# Patient Record
Sex: FEMALE | Race: WHITE | Employment: UNEMPLOYED | URBAN - METROPOLITAN AREA
[De-identification: names, ages, dates, MRNs, and addresses within clinical notes are randomized per-mention and may not be internally consistent; named-entity substitution may affect disease eponyms.]

---

## 2016-09-12 LAB — GRBS, EXTERNAL: POSITIVE

## 2017-01-30 PROBLEM — D50.9 IRON DEFICIENCY ANEMIA: Status: ACTIVE | Noted: 2017-01-30

## 2017-02-09 ENCOUNTER — HOSPITAL ENCOUNTER (EMERGENCY)
Age: 22
Discharge: HOME | End: 2017-02-09
Attending: OBSTETRICS & GYNECOLOGY | Admitting: OBSTETRICS & GYNECOLOGY
Payer: COMMERCIAL

## 2017-02-09 VITALS — DIASTOLIC BLOOD PRESSURE: 74 MMHG | TEMPERATURE: 98.2 F | SYSTOLIC BLOOD PRESSURE: 140 MMHG | HEART RATE: 100 BPM

## 2017-02-09 LAB
ERYTHROCYTE [DISTWIDTH] IN BLOOD BY AUTOMATED COUNT: 13.4 % (ref 11.9–14.6)
HCT VFR BLD AUTO: 31 % (ref 35.8–46.3)
HGB BLD-MCNC: 10.5 G/DL (ref 11.7–15.4)
MCH RBC QN AUTO: 31.1 PG (ref 26.1–32.9)
MCHC RBC AUTO-ENTMCNC: 33.9 G/DL (ref 31.4–35)
MCV RBC AUTO: 91.7 FL (ref 79.6–97.8)
PLATELET # BLD AUTO: 247 K/UL (ref 150–450)
PMV BLD AUTO: 10.4 FL (ref 10.8–14.1)
RBC # BLD AUTO: 3.38 M/UL (ref 4.05–5.25)
WBC # BLD AUTO: 11.9 K/UL (ref 4.3–11.1)

## 2017-02-09 PROCEDURE — 85027 COMPLETE CBC AUTOMATED: CPT | Performed by: OBSTETRICS & GYNECOLOGY

## 2017-02-09 PROCEDURE — 59025 FETAL NON-STRESS TEST: CPT

## 2017-02-09 PROCEDURE — 99283 EMERGENCY DEPT VISIT LOW MDM: CPT

## 2017-02-09 PROCEDURE — 36415 COLL VENOUS BLD VENIPUNCTURE: CPT | Performed by: OBSTETRICS & GYNECOLOGY

## 2017-02-09 NOTE — IP AVS SNAPSHOT
Summary of Care Report The Summary of Care report has been created to help improve care coordination. Users with access to [facility] or [address] (Web-based application) may access additional patient information including the Discharge Summary. If you are not currently a [address] user and need more information, please call the number listed below in the [place] [address] section and ask to be connected with Medical Records. Facility Information Name Address Phone [address] [place] [address] [address] [place] [id number] [phone] Patient Information Patient Name Sex LATISHA Kee ([id number]) Female 1995 Discharge Information Admitting Provider Service Area Unit Emeka Paulino MD / [address] [facility] 4 Antepartum / [phone] Discharge Provider Discharge Date/Time Discharge Disposition Destination (none) (none) (none) (none) Patient Language Language ENGLISH [13] Problem List as of 2017  Date Reviewed: 2017 Codes Priority Class Noted - Resolved Former smoker ICD-10-CM: Z45.985 ICD-9-CM: V15.82   2016 - Present Overview Signed 2016 11:16 PM by Chandni Elizabeth MD  
  Quit w/ +hcg Supervision of low-risk pregnancy ICD-10-CM: Z34.90 ICD-9-CM: V22.1   2016 - Present Overview Signed 2016  9:55 AM by Alvarez Barron RN  
  · Start Vitamin D 2000IU daily. · Start Calcium 1000mg daily. Group B streptococcal bacteriuria ICD-10-CM: R82.71 ICD-9-CM: 599.0, 041.02   2016 - Present Overview Addendum 2017  8:29 AM by Alvarez Barron RN Asymptomatic GBS bacteriuria w/ concomitant Citrobacter on intake--sensitive to Amoxicillin. Treated w/ Augmentin (Rx ) to adequately cover both No need to repeat GBS at 36wks--will need ppx in labor. RADHIKA neg Will need suppression if has another UTI this pregnancy Scoliosis ICD-10-CM: M41.9 ICD-9-CM: 737.30   10/3/2016 - Present Overview Signed 10/3/2016  3:06 PM by Bridgette Pederson MD  
  Pt reports dx 3-4yrs ago but no FU; no surgeries/rods Family history of congenital heart defect ICD-10-CM: Z82.79 ICD-9-CM: V19.5   11/15/2016 - Present Overview Addendum 2/1/2017  9:47 AM by Yury Rankin MD  
  11/23 [facility]- Normal fetal echo. 2/1/2017 [facility]- normal follow up fetal echo Fetal hydronephrosis in pregnancy, antepartum condition ICD-10-CM: O35. 8XX0 ICD-9-CM: 655.83   11/23/2016 - Present Overview Addendum 2/1/2017  9:41 AM by Inge Penn RN  
  11/23/2016 at [facility]:  Left 3.5mm (nl), Right 5.0 mm, mild hydro. Explained finding to patient and  , reassured. 2/1/2017 at [facility]:  Fetal hydro resolved. · No further follow up needed at [facility]. · Follow up fetal growth in OB office in 4 weeks. Near syncope ICD-10-CM: R55 
ICD-9-CM: 780.2   12/22/2016 - Present Overview Signed 12/22/2016 12:14 PM by Yun Bowman MD  
  12/22/16 - seen in ED; will refer to cardiology Iron deficiency anemia ICD-10-CM: D50.9 ICD-9-CM: 280.9   1/30/2017 - Present You are allergic to the following No active allergies Current Discharge Medication List  
  
ASK your doctor about these medications Dose & Instructions Dispensing Information Comments  
 calcium carbonate 200 mg calcium (500 mg) Chew Commonly known as:  TUMS Dose:  1 Tab Take 1 Tab by mouth daily. Indications: as needed Refills:  0  
   
 PNV no.24-iron-folic acid-dha -333 mg-mcg-mg Cmpk Commonly known as:  PRENATAL DHA+COMPLETE PRENATAL Dose:  1 Tab Take 1 Tab by mouth daily. OTC Quantity:  30 Tab Refills:  11 SLOW  mg (45 mg iron) ER tablet Generic drug:  ferrous sulfate Take  by mouth Daily (before breakfast). Refills:  0 Follow-up Information None Discharge Instructions DISCHARGE SUMMARY from Nurse The following personal items are in your possession at time of discharge: 
 
  
  
  
  
  
  
  
  
 
 
 
 
PATIENT INSTRUCTIONS: 
 
After general anesthesia or intravenous sedation, for 24 hours or while taking prescription Narcotics: · Limit your activities · Do not drive and operate hazardous machinery · Do not make important personal or business decisions · Do  not drink alcoholic beverages · If you have not urinated within 8 hours after discharge, please contact your surgeon on call. Report the following to your surgeon: 
· Excessive pain, swelling, redness or odor of or around the surgical area · Temperature over 100.5 · Nausea and vomiting lasting longer than 4 hours or if unable to take medications · Any signs of decreased circulation or nerve impairment to extremity: change in color, persistent  numbness, tingling, coldness or increase pain · Any questions What to do at Home: 
Recommended activity: Activity as tolerated. Increase iron supplement to twice a day. Call [facility] in the morning for a follow-up appointment. If you experience any of the following symptoms your water breaks, bright red vaginal bleeding, more than 6 contractions in an hour please follow up with your physician or [facility]. *  Please give a list of your current medications to your Primary Care Provider. *  Please update this list whenever your medications are discontinued, doses are 
    changed, or new medications (including over-the-counter products) are added. *  Please carry medication information at all times in case of emergency situations. These are general instructions for a healthy lifestyle: No smoking/ No tobacco products/ Avoid exposure to second hand smoke Surgeon General's Warning:  Quitting smoking now greatly reduces serious risk to your health. Obesity, smoking, and sedentary lifestyle greatly increases your risk for illness A healthy diet, regular physical exercise & weight monitoring are important for maintaining a healthy lifestyle You may be retaining fluid if you have a history of heart failure or if you experience any of the following symptoms:  Weight gain of 3 pounds or more overnight or 5 pounds in a week, increased swelling in our hands or feet or shortness of breath while lying flat in bed. Please call your doctor as soon as you notice any of these symptoms; do not wait until your next office visit. Recognize signs and symptoms of STROKE: 
 
F-face looks uneven A-arms unable to move or move unevenly S-speech slurred or non-existent T-time-call 911 as soon as signs and symptoms begin-DO NOT go Back to bed or wait to see if you get better-TIME IS BRAIN. Warning Signs of HEART ATTACK Call 911 if you have these symptoms: 
? Chest discomfort. Most heart attacks involve discomfort in the center of the chest that lasts more than a few minutes, or that goes away and comes back. It can feel like uncomfortable pressure, squeezing, fullness, or pain. ? Discomfort in other areas of the upper body. Symptoms can include pain or discomfort in one or both arms, the back, neck, jaw, or stomach. ? Shortness of breath with or without chest discomfort. ? Other signs may include breaking out in a cold sweat, nausea, or lightheadedness. Don't wait more than five minutes to call [address]! Fast action can save your life. Calling 911 is almost always the fastest way to get lifesaving treatment. Emergency Medical Services staff can begin treatment when they arrive  up to an hour sooner than if someone gets to the hospital by car. The discharge information has been reviewed with the patient. The patient verbalized understanding. Discharge medications reviewed with the patient and appropriate educational materials and side effects teaching were provided. Pregnancy Precautions: Care Instructions Your Care Instructions There is no sure way to prevent labor before your due date ( labor) or to prevent most other pregnancy problems. But there are things you can do to increase your chances of a healthy pregnancy. Go to your appointments, follow your doctor's advice, and take good care of yourself. Eat well, and exercise (if your doctor agrees). And make sure to drink plenty of water. Follow-up care is a key part of your treatment and safety. Be sure to make and go to all appointments, and call your doctor if you are having problems. It's also a good idea to know your test results and keep a list of the medicines you take. How can you care for yourself at home? · Make sure you go to your prenatal appointments. At each visit, your doctor will check your blood pressure. Your doctor will also check to see if you have protein in your urine. High blood pressure and protein in urine are signs of preeclampsia. This condition can be dangerous for you and your baby. · Drink plenty of fluids, enough so that your urine is light yellow or clear like water. Dehydration can cause contractions. If you have kidney, heart, or liver disease and have to limit fluids, talk with your doctor before you increase the amount of fluids you drink. · Tell your doctor right away if you notice any symptoms of an infection, such as: ¨ Burning when you urinate. ¨ A foul-smelling discharge from your vagina. ¨ Vaginal itching. ¨ Unexplained fever. ¨ Unusual pain or soreness in your uterus or lower belly. · Eat a balanced diet. Include plenty of foods that are high in calcium and iron. ¨ Foods high in calcium include milk, cheese, yogurt, almonds, and broccoli.  
¨ Foods high in iron include red meat, shellfish, poultry, eggs, beans, raisins, whole-grain bread, and leafy green vegetables. · Do not smoke. If you need help quitting, talk to your doctor about stop-smoking programs and medicines. These can increase your chances of quitting for good. · Do not drink alcohol or use illegal drugs. · Follow your doctor's directions about activity. Your doctor will let you know how much, if any, exercise you can do. · Ask your doctor if you can have sex. If you are at risk for early labor, your doctor may ask you to not have sex. · Take care to prevent falls. During pregnancy, your joints are loose, and your balance is off. Sports such as bicycling, skiing, or in-line skating can increase your risk of falling. And don't ride horses or motorcycles, dive, water ski, scuba dive, or parachute jump while you are pregnant. · Avoid getting very hot. Do not use saunas or hot tubs. Avoid staying out in the sun in hot weather for long periods. Take acetaminophen (Tylenol) to lower a high fever. · Do not take any over-the-counter or herbal medicines or supplements without talking to your doctor or pharmacist first. 
When should you call for help? Call 911 anytime you think you may need emergency care. For example, call if: 
· You passed out (lost consciousness). · You have severe vaginal bleeding. · You have severe pain in your belly or pelvis. · You have had fluid gushing or leaking from your vagina and you know or think the umbilical cord is bulging into your vagina. If this happens, immediately get down on your knees so your rear end (buttocks) is higher than your head. This will decrease the pressure on the cord until help arrives. Call your doctor now or seek immediate medical care if: 
· You have signs of preeclampsia, such as: 
¨ Sudden swelling of your face, hands, or feet. ¨ New vision problems (such as dimness or blurring). ¨ A severe headache. · You have any vaginal bleeding. · You have belly pain or cramping. · You have a fever. · You have had regular contractions (with or without pain) for an hour. This means that you have 8 or more within 1 hour or 4 or more in 20 minutes after you change your position and drink fluids. · You have a sudden release of fluid from your vagina. · You have low back pain or pelvic pressure that does not go away. · You notice that your baby has stopped moving or is moving much less than normal. 
Watch closely for changes in your health, and be sure to contact your doctor if you have any problems. Where can you learn more? Go to http://rebekah-nadja.info/. Enter [id number] in the search box to learn more about \"Pregnancy Precautions: Care Instructions. \" Current as of: May 30, 2016 Content Version: 11.1 © 2398-2887 [employer]. Care instructions adapted under license by [employer] (which disclaims liability or warranty for this information). If you have questions about a medical condition or this instruction, always ask your healthcare professional. [place] [address] any warranty or liability for your use of this information. Chart Review Routing History No Routing History on File

## 2017-02-09 NOTE — IP AVS SNAPSHOT
Current Discharge Medication List  
  
ASK your doctor about these medications Dose & Instructions Dispensing Information Comments Morning Noon Evening Bedtime  
 calcium carbonate 200 mg calcium (500 mg) Chew Commonly known as:  TUMS Your next dose is: Today, Tomorrow Other:  ____________ Dose:  1 Tab Take 1 Tab by mouth daily. Indications: as needed Refills:  0  
     
   
   
   
  
 PNV no.24-iron-folic acid-dha -556 mg-mcg-mg Cmpk Commonly known as:  PRENATAL DHA+COMPLETE PRENATAL Your next dose is: Today, Tomorrow Other:  ____________ Dose:  1 Tab Take 1 Tab by mouth daily. OTC Quantity:  30 Tab Refills:  11 SLOW  mg (45 mg iron) ER tablet Generic drug:  ferrous sulfate Your next dose is: Today, Tomorrow Other:  ____________ Take  by mouth Daily (before breakfast). Refills:  0

## 2017-02-09 NOTE — IP AVS SNAPSHOT
Reena Andersen 
 
 
 [address] 
[phone] Patient: Tamera Baird MRN: [id number] :[id number] You are allergic to the following No active allergies Recent Documentation OB Status Smoking Status Pregnant Former Smoker Emergency Contacts Name Discharge Info Relation Home Work Mobile Francesca Marie  Mother [14] [phone] Nathanael Newell  Boyfriend [17] [phone] About your hospitalization You were admitted on:  N/A You last received care in the:  E 4 [facility] You were discharged on:  2017 Unit phone number:  [phone] Why you were hospitalized Your primary diagnosis was:  Not on File Providers Seen During Your Hospitalizations Provider Role Specialty Primary office phone Jessica Yadav MD Attending Provider Obstetrics & Gynecology [phone] Your Primary Care Physician (PCP) Primary Care Physician Office Phone Office Fax UNKNOWN, PROVIDER ** None ** ** None ** Follow-up Information None Your Appointments 2017  1:30 PM EST Return OB with Raman Herrera MD  
[facility]) [address] [id number] [phone] 2017  8:45 AM EST Return OB with Raman Herrera MD  
[facility]) [address] [place] [id number]  
[phone] 2017  2:00 PM EST Return OB with Raman Herrera MD  
[facility]) [address] [address]  
[phone] 2017  2:00 PM EDT Return OB with Raman Herrera MD  
[facility]) [address] [place] [address] 187 [facility] [phone] [phone] Current Discharge Medication List  
  
ASK your doctor about these medications Dose & Instructions Dispensing Information Comments Morning Noon Evening Bedtime  
 calcium carbonate 200 mg calcium (500 mg) Chew Commonly known as:  TUMS Your next dose is: Today, Tomorrow Other:  _________ Dose:  1 Tab Take 1 Tab by mouth daily. Indications: as needed Refills:  0  
     
   
   
   
  
 PNV no.24-iron-folic acid-dha -978 mg-mcg-mg Cmpk Commonly known as:  PRENATAL DHA+COMPLETE PRENATAL Your next dose is: Today, Tomorrow Other:  _________ Dose:  1 Tab Take 1 Tab by mouth daily. OTC Quantity:  30 Tab Refills:  11 SLOW  mg (45 mg iron) ER tablet Generic drug:  ferrous sulfate Your next dose is: Today, Tomorrow Other:  _________ Take  by mouth Daily (before breakfast). Refills:  0 Discharge Instructions DISCHARGE SUMMARY from Nurse The following personal items are in your possession at time of discharge: 
 
  
  
  
  
  
  
  
  
 
 
 
 
PATIENT INSTRUCTIONS: 
 
After general anesthesia or intravenous sedation, for 24 hours or while taking prescription Narcotics: · Limit your activities · Do not drive and operate hazardous machinery · Do not make important personal or business decisions · Do  not drink alcoholic beverages · If you have not urinated within 8 hours after discharge, please contact your surgeon on call. Report the following to your surgeon: 
· Excessive pain, swelling, redness or odor of or around the surgical area · Temperature over 100.5 · Nausea and vomiting lasting longer than 4 hours or if unable to take medications · Any signs of decreased circulation or nerve impairment to extremity: change in color, persistent  numbness, tingling, coldness or increase pain · Any questions What to do at Home: 
Recommended activity: Activity as tolerated. Increase iron supplement to twice a day. Call [facility] in the morning for a follow-up appointment. If you experience any of the following symptoms your water breaks, bright red vaginal bleeding, more than 6 contractions in an hour please follow up with your physician or [address]. *  Please give a list of your current medications to your Primary Care Provider. *  Please update this list whenever your medications are discontinued, doses are 
    changed, or new medications (including over-the-counter products) are added. *  Please carry medication information at all times in case of emergency situations. These are general instructions for a healthy lifestyle: No smoking/ No tobacco products/ Avoid exposure to second hand smoke Surgeon General's Warning:  Quitting smoking now greatly reduces serious risk to your health. Obesity, smoking, and sedentary lifestyle greatly increases your risk for illness A healthy diet, regular physical exercise & weight monitoring are important for maintaining a healthy lifestyle You may be retaining fluid if you have a history of heart failure or if you experience any of the following symptoms:  Weight gain of 3 pounds or more overnight or 5 pounds in a week, increased swelling in our hands or feet or shortness of breath while lying flat in bed. Please call your doctor as soon as you notice any of these symptoms; do not wait until your next office visit. Recognize signs and symptoms of STROKE: 
 
F-face looks uneven A-arms unable to move or move unevenly S-speech slurred or non-existent T-time-call 911 as soon as signs and symptoms begin-DO NOT go Back to bed or wait to see if you get better-TIME IS BRAIN.  
 
Warning Signs of HEART ATTACK  
 
 Call 911 if you have these symptoms: 
? Chest discomfort. Most heart attacks involve discomfort in the center of the chest that lasts more than a few minutes, or that goes away and comes back. It can feel like uncomfortable pressure, squeezing, fullness, or pain. ? Discomfort in other areas of the upper body. Symptoms can include pain or discomfort in one or both arms, the back, neck, jaw, or stomach. ? Shortness of breath with or without chest discomfort. ? Other signs may include breaking out in a cold sweat, nausea, or lightheadedness. Don't wait more than five minutes to call [address]! Fast action can save your life. Calling 911 is almost always the fastest way to get lifesaving treatment. Emergency Medical Services staff can begin treatment when they arrive  up to an hour sooner than if someone gets to the hospital by car. The discharge information has been reviewed with the patient. The patient verbalized understanding. Discharge medications reviewed with the patient and appropriate educational materials and side effects teaching were provided. Pregnancy Precautions: Care Instructions Your Care Instructions There is no sure way to prevent labor before your due date ( labor) or to prevent most other pregnancy problems. But there are things you can do to increase your chances of a healthy pregnancy. Go to your appointments, follow your doctor's advice, and take good care of yourself. Eat well, and exercise (if your doctor agrees). And make sure to drink plenty of water. Follow-up care is a key part of your treatment and safety. Be sure to make and go to all appointments, and call your doctor if you are having problems. It's also a good idea to know your test results and keep a list of the medicines you take. How can you care for yourself at home? · Make sure you go to your prenatal appointments.  At each visit, your doctor will check your blood pressure. Your doctor will also check to see if you have protein in your urine. High blood pressure and protein in urine are signs of preeclampsia. This condition can be dangerous for you and your baby. · Drink plenty of fluids, enough so that your urine is light yellow or clear like water. Dehydration can cause contractions. If you have kidney, heart, or liver disease and have to limit fluids, talk with your doctor before you increase the amount of fluids you drink. · Tell your doctor right away if you notice any symptoms of an infection, such as: ¨ Burning when you urinate. ¨ A foul-smelling discharge from your vagina. ¨ Vaginal itching. ¨ Unexplained fever. ¨ Unusual pain or soreness in your uterus or lower belly. · Eat a balanced diet. Include plenty of foods that are high in calcium and iron. ¨ Foods high in calcium include milk, cheese, yogurt, almonds, and broccoli. ¨ Foods high in iron include red meat, shellfish, poultry, eggs, beans, raisins, whole-grain bread, and leafy green vegetables. · Do not smoke. If you need help quitting, talk to your doctor about stop-smoking programs and medicines. These can increase your chances of quitting for good. · Do not drink alcohol or use illegal drugs. · Follow your doctor's directions about activity. Your doctor will let you know how much, if any, exercise you can do. · Ask your doctor if you can have sex. If you are at risk for early labor, your doctor may ask you to not have sex. · Take care to prevent falls. During pregnancy, your joints are loose, and your balance is off. Sports such as bicycling, skiing, or in-line skating can increase your risk of falling. And don't ride horses or motorcycles, dive, water ski, scuba dive, or parachute jump while you are pregnant. · Avoid getting very hot. Do not use saunas or hot tubs. Avoid staying out in the sun in hot weather for long periods.  Take acetaminophen (Tylenol) to lower a high fever. · Do not take any over-the-counter or herbal medicines or supplements without talking to your doctor or pharmacist first. 
When should you call for help? Call 911 anytime you think you may need emergency care. For example, call if: 
· You passed out (lost consciousness). · You have severe vaginal bleeding. · You have severe pain in your belly or pelvis. · You have had fluid gushing or leaking from your vagina and you know or think the umbilical cord is bulging into your vagina. If this happens, immediately get down on your knees so your rear end (buttocks) is higher than your head. This will decrease the pressure on the cord until help arrives. Call your doctor now or seek immediate medical care if: 
· You have signs of preeclampsia, such as: 
¨ Sudden swelling of your face, hands, or feet. ¨ New vision problems (such as dimness or blurring). ¨ A severe headache. · You have any vaginal bleeding. · You have belly pain or cramping. · You have a fever. · You have had regular contractions (with or without pain) for an hour. This means that you have 8 or more within 1 hour or 4 or more in 20 minutes after you change your position and drink fluids. · You have a sudden release of fluid from your vagina. · You have low back pain or pelvic pressure that does not go away. · You notice that your baby has stopped moving or is moving much less than normal. 
Watch closely for changes in your health, and be sure to contact your doctor if you have any problems. Where can you learn more? Go to http://rebekah-nadja.info/. Enter [id number] in the search box to learn more about \"Pregnancy Precautions: Care Instructions. \" Current as of: May 30, 2016 Content Version: 11.1 © 4179-5791 [employer].  Care instructions adapted under license by [employer] (which disclaims liability or warranty for this information). If you have questions about a medical condition or this instruction, always ask your healthcare professional. Dayna Melgoza any warranty or liability for your use of this information. Discharge Orders None [facility]! Dear Bonilla [address]: Thank you for requesting a Med[employer] account. Our records indicate that you already have an active Med[employer] DX account. You can access your account anytime at [email or web address] Did you know that you can access your hospital and ER discharge instructions at any time in Med[employer]? You can also review all of your test results from your hospital stay or ER visit. Additional Information If you have questions, please visit the Frequently Asked Questions section of the MedTest DX website at [email or web address]/. Remember, MedTest DX is NOT to be used for urgent needs. For medical emergencies, dial 911. Now available from your iPhone and Android! General Information Please provide this summary of care documentation to your next provider. Patient Signature:  ____________________________________________________________ Date:  ____________________________________________________________  
  
Joycelyn Asencio Provider Signature:  ____________________________________________________________ Date:  ____________________________________________________________

## 2017-02-10 NOTE — PROGRESS NOTES
Discharge instructions reviewed with patient; pt verbalizes understanding. Pt discharged to home accompanied by  Significant other.

## 2017-02-10 NOTE — DISCHARGE INSTRUCTIONS
DISCHARGE SUMMARY from Nurse    The following personal items are in your possession at time of discharge:                                    PATIENT INSTRUCTIONS:    After general anesthesia or intravenous sedation, for 24 hours or while taking prescription Narcotics:  · Limit your activities  · Do not drive and operate hazardous machinery  · Do not make important personal or business decisions  · Do  not drink alcoholic beverages  · If you have not urinated within 8 hours after discharge, please contact your surgeon on call. Report the following to your surgeon:  · Excessive pain, swelling, redness or odor of or around the surgical area  · Temperature over 100.5  · Nausea and vomiting lasting longer than 4 hours or if unable to take medications  · Any signs of decreased circulation or nerve impairment to extremity: change in color, persistent  numbness, tingling, coldness or increase pain  · Any questions        What to do at Home:  Recommended activity: Activity as tolerated. Increase iron supplement to twice a day. Call [facility] in the morning for a follow-up appointment. If you experience any of the following symptoms your water breaks, bright red vaginal bleeding, more than 6 contractions in an hour please follow up with your physician or [facility]. *  Please give a list of your current medications to your Primary Care Provider. *  Please update this list whenever your medications are discontinued, doses are      changed, or new medications (including over-the-counter products) are added. *  Please carry medication information at all times in case of emergency situations. These are general instructions for a healthy lifestyle:    No smoking/ No tobacco products/ Avoid exposure to second hand smoke    Surgeon General's Warning:  Quitting smoking now greatly reduces serious risk to your health.     Obesity, smoking, and sedentary lifestyle greatly increases your risk for illness    A healthy diet, regular physical exercise & weight monitoring are important for maintaining a healthy lifestyle    You may be retaining fluid if you have a history of heart failure or if you experience any of the following symptoms:  Weight gain of 3 pounds or more overnight or 5 pounds in a week, increased swelling in our hands or feet or shortness of breath while lying flat in bed. Please call your doctor as soon as you notice any of these symptoms; do not wait until your next office visit. Recognize signs and symptoms of STROKE:    F-face looks uneven    A-arms unable to move or move unevenly    S-speech slurred or non-existent    T-time-call 911 as soon as signs and symptoms begin-DO NOT go       Back to bed or wait to see if you get better-TIME IS BRAIN. Warning Signs of HEART ATTACK     Call 911 if you have these symptoms:   Chest discomfort. Most heart attacks involve discomfort in the center of the chest that lasts more than a few minutes, or that goes away and comes back. It can feel like uncomfortable pressure, squeezing, fullness, or pain.  Discomfort in other areas of the upper body. Symptoms can include pain or discomfort in one or both arms, the back, neck, jaw, or stomach.  Shortness of breath with or without chest discomfort.  Other signs may include breaking out in a cold sweat, nausea, or lightheadedness. Don't wait more than five minutes to call 911 - MINUTES MATTER! Fast action can save your life. Calling 911 is almost always the fastest way to get lifesaving treatment. Emergency Medical Services staff can begin treatment when they arrive -- up to an hour sooner than if someone gets to the hospital by car. The discharge information has been reviewed with the patient. The patient verbalized understanding. Discharge medications reviewed with the patient and appropriate educational materials and side effects teaching were provided.          Pregnancy Precautions: Care Instructions  Your Care Instructions  There is no sure way to prevent labor before your due date ( labor) or to prevent most other pregnancy problems. But there are things you can do to increase your chances of a healthy pregnancy. Go to your appointments, follow your doctor's advice, and take good care of yourself. Eat well, and exercise (if your doctor agrees). And make sure to drink plenty of water. Follow-up care is a key part of your treatment and safety. Be sure to make and go to all appointments, and call your doctor if you are having problems. It's also a good idea to know your test results and keep a list of the medicines you take. How can you care for yourself at home? · Make sure you go to your prenatal appointments. At each visit, your doctor will check your blood pressure. Your doctor will also check to see if you have protein in your urine. High blood pressure and protein in urine are signs of preeclampsia. This condition can be dangerous for you and your baby. · Drink plenty of fluids, enough so that your urine is light yellow or clear like water. Dehydration can cause contractions. If you have kidney, heart, or liver disease and have to limit fluids, talk with your doctor before you increase the amount of fluids you drink. · Tell your doctor right away if you notice any symptoms of an infection, such as:  ¨ Burning when you urinate. ¨ A foul-smelling discharge from your vagina. ¨ Vaginal itching. ¨ Unexplained fever. ¨ Unusual pain or soreness in your uterus or lower belly. · Eat a balanced diet. Include plenty of foods that are high in calcium and iron. ¨ Foods high in calcium include milk, cheese, yogurt, almonds, and broccoli. ¨ Foods high in iron include red meat, shellfish, poultry, eggs, beans, raisins, whole-grain bread, and leafy green vegetables. · Do not smoke. If you need help quitting, talk to your doctor about stop-smoking programs and medicines. These can increase your chances of quitting for good. · Do not drink alcohol or use illegal drugs. · Follow your doctor's directions about activity. Your doctor will let you know how much, if any, exercise you can do. · Ask your doctor if you can have sex. If you are at risk for early labor, your doctor may ask you to not have sex. · Take care to prevent falls. During pregnancy, your joints are loose, and your balance is off. Sports such as bicycling, skiing, or in-line skating can increase your risk of falling. And don't ride horses or motorcycles, dive, water ski, scuba dive, or parachute jump while you are pregnant. · Avoid getting very hot. Do not use saunas or hot tubs. Avoid staying out in the sun in hot weather for long periods. Take acetaminophen (Tylenol) to lower a high fever. · Do not take any over-the-counter or herbal medicines or supplements without talking to your doctor or pharmacist first.  When should you call for help? Call 911 anytime you think you may need emergency care. For example, call if:  · You passed out (lost consciousness). · You have severe vaginal bleeding. · You have severe pain in your belly or pelvis. · You have had fluid gushing or leaking from your vagina and you know or think the umbilical cord is bulging into your vagina. If this happens, immediately get down on your knees so your rear end (buttocks) is higher than your head. This will decrease the pressure on the cord until help arrives. Call your doctor now or seek immediate medical care if:  · You have signs of preeclampsia, such as:  ¨ Sudden swelling of your face, hands, or feet. ¨ New vision problems (such as dimness or blurring). ¨ A severe headache. · You have any vaginal bleeding. · You have belly pain or cramping. · You have a fever. · You have had regular contractions (with or without pain) for an hour.  This means that you have 8 or more within 1 hour or 4 or more in 20 minutes after you change your position and drink fluids. · You have a sudden release of fluid from your vagina. · You have low back pain or pelvic pressure that does not go away. · You notice that your baby has stopped moving or is moving much less than normal.  Watch closely for changes in your health, and be sure to contact your doctor if you have any problems. Where can you learn more? Go to http://rebekah-nadja.info/. Enter [id number] in the search box to learn more about \"Pregnancy Precautions: Care Instructions. \"  Current as of: May 30, 2016  Content Version: 11.1  © 0646-8160 [employer]. Care instructions adapted under license by [employer] (which disclaims liability or warranty for this information). If you have questions about a medical condition or this instruction, always ask your healthcare professional. [address] any warranty or liability for your use of this information.

## 2017-02-10 NOTE — PROGRESS NOTES
Pt presents from home with c/o \"blacking out a couple of times yesterday at work \" , and of having pain in upper abd. EFM applied after explanation and verbal consent obtained.

## 2017-02-10 NOTE — PROGRESS NOTES
Lab results called to Dr. Alex Akhtar. Orders received to discharge patient with instructions to increase Iron supplement to twice a day and call ob/gyn office in the morning for a follow-up appointment.

## 2017-03-07 PROBLEM — O40.9XX0 POLYHYDRAMNIOS: Status: ACTIVE | Noted: 2017-03-07

## 2017-03-24 ENCOUNTER — HOSPITAL ENCOUNTER (EMERGENCY)
Age: 22
Discharge: HOME | End: 2017-03-24
Attending: OBSTETRICS & GYNECOLOGY | Admitting: OBSTETRICS & GYNECOLOGY
Payer: COMMERCIAL

## 2017-03-24 VITALS
BODY MASS INDEX: 30.22 KG/M2 | SYSTOLIC BLOOD PRESSURE: 128 MMHG | DIASTOLIC BLOOD PRESSURE: 72 MMHG | WEIGHT: 177 LBS | HEART RATE: 114 BPM | RESPIRATION RATE: 18 BRPM | HEIGHT: 64 IN

## 2017-03-24 PROCEDURE — 59025 FETAL NON-STRESS TEST: CPT

## 2017-03-24 PROCEDURE — 99282 EMERGENCY DEPT VISIT SF MDM: CPT

## 2017-03-24 NOTE — DISCHARGE INSTRUCTIONS
Learning About When to Call Your Doctor During Pregnancy (After 20 Weeks)  Your Care Instructions  It's common to have concerns about what might be a problem during pregnancy. Although most pregnant women don't have any serious problems, it's important to know when to call your doctor if you have certain symptoms or signs of labor. These are general suggestions. Your doctor may give you some more information about when to call. When to call your doctor (after 20 weeks)  Call 911 anytime you think you may need emergency care. For example, call if:  · You have severe vaginal bleeding. · You have sudden, severe pain in your belly. · You passed out (lost consciousness). · You have a seizure. · You see or feel the umbilical cord. · You think you are about to deliver your baby and can't make it safely to the hospital.  Call your doctor now or seek immediate medical care if:  · You have vaginal bleeding. · You have belly pain. · You have a fever. · You have symptoms of preeclampsia, such as:  ¨ Sudden swelling of your face, hands, or feet. ¨ New vision problems (such as dimness or blurring). ¨ A severe headache. · You have a sudden release of fluid from your vagina. (You think your water broke.)  · You think that you may be in labor. This means that you've had at least 4 contractions within 20 minutes or at least 8 contractions in an hour. · You notice that your baby has stopped moving or is moving much less than normal.  · You have symptoms of a urinary tract infection. These may include:  ¨ Pain or burning when you urinate. ¨ A frequent need to urinate without being able to pass much urine. ¨ Pain in the flank, which is just below the rib cage and above the waist on either side of the back. ¨ Blood in your urine. Watch closely for changes in your health, and be sure to contact your doctor if:  · You have vaginal discharge that smells bad.   · You have skin changes, such as:  ¨ A rash.  ¨ Itching. ¨ Yellow color to your skin. · You have other concerns about your pregnancy. If you have labor signs at 37 weeks or more  If you have signs of labor at 37 weeks or more, your doctor may tell you to call when your labor becomes more active. Symptoms of active labor include:  · Contractions that are regular. · Contractions that are less than 5 minutes apart. · Contractions that are hard to talk through. Follow-up care is a key part of your treatment and safety. Be sure to make and go to all appointments, and call your doctor if you are having problems. It's also a good idea to know your test results and keep a list of the medicines you take. Where can you learn more? Go to http://rebekah-nadja.info/. Enter  in the search box to learn more about \"Learning About When to Call Your Doctor During Pregnancy (After 20 Weeks). \"  Current as of: May 30, 2016  Content Version: 11.1  © 1625-2628 [employer]. Care instructions adapted under license by [employer] (which disclaims liability or warranty for this information). If you have questions about a medical condition or this instruction, always ask your healthcare professional. [place] [address] any warranty or liability for your use of this information.

## 2017-03-24 NOTE — ED PROVIDER NOTES
Chief Complaint:      24 y.o. female at 38w0d  weeks gestation who is seen for labor check and decreased fetal movement. Pt denies VB or LOF. HISTORY:    History   Sexual Activity    Sexual activity: Yes    Partners: Male    Birth control/ protection: None     Patient's last menstrual period was 07/01/2016. Social History     Social History    Marital status: SINGLE     Spouse name: Louis Baird Number of children: N/A    Years of education: N/A     Occupational History    Not on file. Social History Main Topics    Smoking status: Former Smoker     Packs/day: 1.50     Quit date: 8/7/2016    Smokeless tobacco: Never Used    Alcohol use No    Drug use: No    Sexual activity: Yes     Partners: Male     Birth control/ protection: None     Other Topics Concern    Not on file     Social History Narrative       No past surgical history on file. Past Medical History:   Diagnosis Date    Fetal hydronephrosis in pregnancy, antepartum condition 11/23/2016    Iron deficiency anemia 1/30/2017    Scoliosis          ROS:  A 12 point review of symptoms negative except for chief complaint as described above. PHYSICAL EXAM:  Blood pressure 128/72, pulse (!) 114, resp. rate 18, height 5' 4\" (1.626 m), weight 80.3 kg (177 lb), last menstrual period 07/01/2016. The patient appears well, alert, oriented x 3. Lungs are clear. Heart RRR, no murmurs. Abdomen soft, nontender, no guarding  No cva tenderness  No fundal tenderness  Upper ext: no edema, reflexes +2  Lower ext: no edema, neg leeann's, reflexes +2  Skin: no rashes or lesions  Mood/ Affect: appropriate  SVE: Closed/50%/Vtx  FHT: Category 1 with mod variability and + accels; reactive NST  TOCO: no ctx      Assessment/Plan:  Pt discharged to home with labor precautions. Pt to f/u with her PObP.

## 2017-03-31 ENCOUNTER — HOSPITAL ENCOUNTER (INPATIENT)
Age: 22
LOS: 4 days | Discharge: HOME | End: 2017-04-04
Attending: OBSTETRICS & GYNECOLOGY | Admitting: OBSTETRICS & GYNECOLOGY
Payer: COMMERCIAL

## 2017-03-31 PROBLEM — O24.419 GESTATIONAL DIABETES: Status: ACTIVE | Noted: 2017-03-31

## 2017-03-31 PROBLEM — O24.913 DIABETES MELLITUS AFFECTING PREGNANCY IN THIRD TRIMESTER: Status: ACTIVE | Noted: 2017-03-31

## 2017-03-31 PROBLEM — O24.419 WHITE CLASSIFICATION A2 GESTATIONAL DIABETES MELLITUS: Status: ACTIVE | Noted: 2017-03-31

## 2017-03-31 PROBLEM — Z37.9 NORMAL LABOR: Status: ACTIVE | Noted: 2017-03-31

## 2017-03-31 PROBLEM — A63.0 GENITAL WARTS: Status: ACTIVE | Noted: 2017-03-31

## 2017-03-31 PROBLEM — Z3A.39 39 WEEKS GESTATION OF PREGNANCY: Status: ACTIVE | Noted: 2017-03-31

## 2017-03-31 LAB
ABO + RH BLD: NORMAL
BLOOD GROUP ANTIBODIES SERPL: NORMAL
ERYTHROCYTE [DISTWIDTH] IN BLOOD BY AUTOMATED COUNT: 13.5 % (ref 11.9–14.6)
ERYTHROCYTE [DISTWIDTH] IN BLOOD BY AUTOMATED COUNT: NORMAL % (ref 11.9–14.6)
GLUCOSE BLD STRIP.AUTO-MCNC: 84 MG/DL (ref 65–100)
HCT VFR BLD AUTO: 33.4 % (ref 35.8–46.3)
HCT VFR BLD AUTO: NORMAL % (ref 35.8–46.3)
HGB BLD-MCNC: 11 G/DL (ref 11.7–15.4)
HGB BLD-MCNC: NORMAL G/DL (ref 11.7–15.4)
MCH RBC QN AUTO: 30.6 PG (ref 26.1–32.9)
MCH RBC QN AUTO: NORMAL PG (ref 26.1–32.9)
MCHC RBC AUTO-ENTMCNC: 32.9 G/DL (ref 31.4–35)
MCHC RBC AUTO-ENTMCNC: NORMAL G/DL (ref 31.4–35)
MCV RBC AUTO: 93 FL (ref 79.6–97.8)
MCV RBC AUTO: NORMAL FL (ref 79.6–97.8)
PLATELET # BLD AUTO: 217 K/UL (ref 150–450)
PLATELET # BLD AUTO: NORMAL K/UL (ref 150–450)
PMV BLD AUTO: 10.5 FL (ref 10.8–14.1)
PMV BLD AUTO: NORMAL FL (ref 10.8–14.1)
RBC # BLD AUTO: 3.59 M/UL (ref 4.05–5.25)
RBC # BLD AUTO: NORMAL M/UL (ref 4.05–5.25)
SPECIMEN EXP DATE BLD: NORMAL
WBC # BLD AUTO: 10.4 K/UL (ref 4.3–11.1)
WBC # BLD AUTO: NORMAL K/UL (ref 4.3–11.1)

## 2017-03-31 PROCEDURE — 86900 BLOOD TYPING SEROLOGIC ABO: CPT | Performed by: OBSTETRICS & GYNECOLOGY

## 2017-03-31 PROCEDURE — 85027 COMPLETE CBC AUTOMATED: CPT | Performed by: OBSTETRICS & GYNECOLOGY

## 2017-03-31 PROCEDURE — 74011250637 HC RX REV CODE- 250/637: Performed by: OBSTETRICS & GYNECOLOGY

## 2017-03-31 PROCEDURE — 82962 GLUCOSE BLOOD TEST: CPT

## 2017-03-31 PROCEDURE — 65270000029 HC RM PRIVATE

## 2017-03-31 RX ORDER — SODIUM CHLORIDE 0.9 % (FLUSH) 0.9 %
5-10 SYRINGE (ML) INJECTION AS NEEDED
Status: DISCONTINUED | OUTPATIENT
Start: 2017-03-31 | End: 2017-04-02

## 2017-03-31 RX ORDER — SODIUM CHLORIDE 0.9 % (FLUSH) 0.9 %
5-10 SYRINGE (ML) INJECTION EVERY 8 HOURS
Status: DISCONTINUED | OUTPATIENT
Start: 2017-03-31 | End: 2017-04-02

## 2017-03-31 RX ORDER — OXYTOCIN/RINGER'S LACTATE 15/250 ML
250 PLASTIC BAG, INJECTION (ML) INTRAVENOUS ONCE
Status: ACTIVE | OUTPATIENT
Start: 2017-03-31 | End: 2017-04-01

## 2017-03-31 RX ORDER — BUTORPHANOL TARTRATE 1 MG/ML
1 INJECTION INTRAMUSCULAR; INTRAVENOUS
Status: DISCONTINUED | OUTPATIENT
Start: 2017-03-31 | End: 2017-04-02 | Stop reason: HOSPADM

## 2017-03-31 RX ORDER — MINERAL OIL
120 OIL (ML) ORAL
Status: DISCONTINUED | OUTPATIENT
Start: 2017-03-31 | End: 2017-03-31 | Stop reason: SDUPTHER

## 2017-03-31 RX ORDER — BUTORPHANOL TARTRATE 1 MG/ML
1 INJECTION INTRAMUSCULAR; INTRAVENOUS
Status: DISCONTINUED | OUTPATIENT
Start: 2017-03-31 | End: 2017-03-31 | Stop reason: SDUPTHER

## 2017-03-31 RX ORDER — OXYTOCIN/RINGER'S LACTATE 30/500 ML
.5-2 PLASTIC BAG, INJECTION (ML) INTRAVENOUS
Status: DISCONTINUED | OUTPATIENT
Start: 2017-03-31 | End: 2017-04-02

## 2017-03-31 RX ORDER — LIDOCAINE HYDROCHLORIDE 20 MG/ML
JELLY TOPICAL
Status: DISCONTINUED | OUTPATIENT
Start: 2017-03-31 | End: 2017-04-02 | Stop reason: HOSPADM

## 2017-03-31 RX ORDER — LIDOCAINE HYDROCHLORIDE 10 MG/ML
1 INJECTION INFILTRATION; PERINEURAL
Status: DISCONTINUED | OUTPATIENT
Start: 2017-03-31 | End: 2017-04-02 | Stop reason: HOSPADM

## 2017-03-31 RX ORDER — DEXTROSE, SODIUM CHLORIDE, SODIUM LACTATE, POTASSIUM CHLORIDE, AND CALCIUM CHLORIDE 5; .6; .31; .03; .02 G/100ML; G/100ML; G/100ML; G/100ML; G/100ML
125 INJECTION, SOLUTION INTRAVENOUS CONTINUOUS
Status: DISCONTINUED | OUTPATIENT
Start: 2017-03-31 | End: 2017-03-31 | Stop reason: SDUPTHER

## 2017-03-31 RX ORDER — OXYTOCIN/RINGER'S LACTATE 15/250 ML
2 PLASTIC BAG, INJECTION (ML) INTRAVENOUS ONCE
Status: ACTIVE | OUTPATIENT
Start: 2017-03-31 | End: 2017-04-01

## 2017-03-31 RX ORDER — LIDOCAINE HYDROCHLORIDE 10 MG/ML
1 INJECTION INFILTRATION; PERINEURAL
Status: DISCONTINUED | OUTPATIENT
Start: 2017-03-31 | End: 2017-03-31 | Stop reason: SDUPTHER

## 2017-03-31 RX ORDER — DEXTROSE, SODIUM CHLORIDE, SODIUM LACTATE, POTASSIUM CHLORIDE, AND CALCIUM CHLORIDE 5; .6; .31; .03; .02 G/100ML; G/100ML; G/100ML; G/100ML; G/100ML
125 INJECTION, SOLUTION INTRAVENOUS CONTINUOUS
Status: DISCONTINUED | OUTPATIENT
Start: 2017-03-31 | End: 2017-04-02

## 2017-03-31 RX ORDER — MINERAL OIL
120 OIL (ML) ORAL
Status: COMPLETED | OUTPATIENT
Start: 2017-03-31 | End: 2017-04-02

## 2017-03-31 RX ORDER — LIDOCAINE HYDROCHLORIDE 20 MG/ML
JELLY TOPICAL
Status: DISCONTINUED | OUTPATIENT
Start: 2017-03-31 | End: 2017-03-31 | Stop reason: SDUPTHER

## 2017-03-31 RX ADMIN — DINOPROSTONE 10 MG: 10 INSERT VAGINAL at 20:32

## 2017-03-31 NOTE — PROGRESS NOTES
Call from Bhupendra Barber, RNC charge RN on L&D; MD called L&D to request appt for cervadil/pitocin induction of labor this pm. Appointment placed; need LI form/orders from MD and will place orders, call pt. Call placed to [facility] to speak with provider or RN.

## 2017-03-31 NOTE — PROGRESS NOTES
Order form received; orders placed for +GBS, cervadil ripening, admission to L&D, pitocin induction in am; Results console checked; orders scanned into EMR from proficient.

## 2017-03-31 NOTE — IP AVS SNAPSHOT
Summary of Care Report The Summary of Care report has been created to help improve care coordination. Users with access to [employer] or [address] (Web-based application) may access additional patient information including the Discharge Summary. If you are not currently a [address] user and need more information, please call the number listed below in the [place] [address] section and ask to be connected with Medical Records. Facility Information Name Address Phone [id number] [place] [address] [address] [place] [phone] Patient Information Patient Name Sex  Barbara Labor ([id number]) Female 1995 Discharge Information Admitting Provider Service Area Unit Shireen Heller MD / [address] 4 Mother Infant / [phone] Discharge Provider Discharge Date/Time Discharge Disposition Destination (none) 2017 (Pending) AHR (none) Patient Language Language ENGLISH [13] Hospital Problems as of 2017  Reviewed: 2017 12:26 PM by Shireen Heller MD  
  
  
  
 Class Noted - Resolved Last Modified POA Active Problems Supervision of low-risk pregnancy  2016 - Present 4/3/2017 by Jens Barreto MD Yes Entered by Shireen Heller MD  
  Overview Signed 2016  9:55 AM by Konrad Mcqueen RN  
   · Start Vitamin D 2000IU daily. · Start Calcium 1000mg daily. Group B streptococcal bacteriuria  2016 - Present 4/3/2017 by Jens Barreto MD Yes Entered by Shireen Heller MD  
  Overview Addendum 2017  8:29 AM by Konrad Mcqueen RN Asymptomatic GBS bacteriuria w/ concomitant Citrobacter on intake--sensitive to Amoxicillin. Treated w/ Augmentin (Rx ) to adequately cover both No need to repeat GBS at 36wks--will need ppx in labor. RADHIKA neg Will need suppression if has another UTI this pregnancy Scoliosis  10/3/2016 - Present 4/3/2017 by Jens Barreto MD Yes Entered by Shireen Heller MD  
  Overview Signed 10/3/2016  3:06 PM by Shireen Heller MD  
   Pt reports dx 3-4yrs ago but no FU; no surgeries/rods Iron deficiency anemia  1/30/2017 - Present 4/3/2017 by Jens Barreto MD Yes Entered by Shireen Heller MD  
  Polyhydramnios  3/7/2017 - Present 4/3/2017 by Jens Barreto MD Yes Entered by Shireen Heller MD  
  Overview Addendum 3/31/2017 10:37 AM by Shireen Heller MD  
   2/21/17 Growth US w/ MFM:  GP 65%, AC 79%, OZZY wnl 
 
3/7/17: 
GP 75% (6#14), AC 97%, OZZY 24 (borderline Poly), bpp 8/8 Transverse lie Passed glucola (106), BPP NV and check OZZY Repeat growth 2-3wks Rx Metformin BPP Qwk  
 
3/31:  GP 97% (9#7), AC >99%, OZZY 20 but 10cm pocket (Poly), vertex, bpp 8/8 -->IOL tonight White classification A2 gestational diabetes mellitus  3/31/2017 - Present 4/3/2017 by Jens Barreto MD Yes Entered by Shireen Heller MD  
  Overview Signed 3/31/2017 10:37 AM by Shireen Heller MD  
   Late dx 
 
3/7/17: 
GP 75% (6#14), AC 97%, OZZY 24 (borderline Poly), bpp 8/8 Transverse lie Passed glucola (106), BPP NV and check OZZY Rx Metformin BPP Qwk  
 
3/31:  GP 97% (9#7), AC >99%, OZZY 20 but 10cm pocket (Poly), vertex, bpp 8/8 -->IOL tonight Genital warts  3/31/2017 - Present 4/1/2017 by Shireen Heller MD Yes Entered by Shireen Heller MD  
  39 weeks gestation of pregnancy  3/31/2017 - Present 4/3/2017 by Jens Barreto MD Yes Entered by Shireen Heller MD  
  Gestational diabetes  3/31/2017 - Present 4/3/2017 by Jens Barreto MD Yes Entered by Shireen Heller MD  
  * (Principal)Large for dates  3/31/2017 - Present 4/3/2017 by Jens Barreto MD Yes   Entered by Shireen Heller MD  
 Chorioamnionitis in third trimester  4/2/2017 - Present 4/3/2017 by Violetta Bashir MD No  
  Entered by Erick Larkin MD  
  Secondary postpartum hemorrhage  4/2/2017 - Present 4/3/2017 by Violetta Bashir MD No  
  Entered by Erick Larikn MD  
  Overview Signed 4/2/2017 12:26 PM by Erick Larkin MD  
   Metheringe, hemabate on L&D immediately pp Continue methergine series on MIU Non-Hospital Problems as of 4/4/2017  Reviewed: 4/2/2017 12:26 PM by Erick Larkin MD  
  
  
  
 Class Noted - Resolved Last Modified Active Problems Former smoker  9/7/2016 - Present 11/23/2016 by Marian Smith MD  
  Entered by Erick Larkin MD  
  Overview Signed 9/7/2016 11:16 PM by Erick Larkin MD  
   Quit w/ +hcg Family history of congenital heart defect  11/15/2016 - Present 2/1/2017 by Paulette Duran MD  
  Entered by Erick Larkin MD  
  Overview Addendum 2/1/2017  9:47 AM by Paulette Duran MD  
   11/23 [facility]- Normal fetal echo. 2/1/2017 [facility]- normal follow up fetal echo Fetal hydronephrosis in pregnancy, antepartum condition  11/23/2016 - Present 2/1/2017 by Paulette Duran MD  
  Entered by Brit Rojas, RN Overview Addendum 2/1/2017  9:41 AM by Brit Rojas RN  
   11/23/2016 at [facility]:  Left 3.5mm (nl), Right 5.0 mm, mild hydro. Explained finding to patient and  , reassured. 2/1/2017 at [facility]:  Fetal hydro resolved. · No further follow up needed at [facility]. · Follow up fetal growth in OB office in 4 weeks. Near syncope  12/22/2016 - Present 2/13/2017 by Erick Larkin MD  
  Entered by Frankie Vidal MD  
  Overview Addendum 2/13/2017  1:40 PM by Erick Larkin MD  
   12/22/16 - seen in ED; will refer to cardiology-->pt refused You are allergic to the following No active allergies Current Discharge Medication List  
  
START taking these medications Dose & Instructions Dispensing Information Comments  
 oxyCODONE IR 5 mg immediate release tablet Commonly known as:  Mary Post Dose:  5 mg Take 1 Tab by mouth every four (4) hours as needed. Max Daily Amount: 30 mg.  
 Quantity:  15 Tab Refills:  0 ASK your doctor about these medications Dose & Instructions Dispensing Information Comments Blood-Glucose Meter monitoring kit Commonly known as:  Burkett Finger Test 4 times a day Quantity:  1 Kit Refills:  0  
   
 calcium carbonate 200 mg calcium (500 mg) Chew Commonly known as:  TUMS Dose:  1 Tab Take 1 Tab by mouth daily. Indications: as needed Refills:  0  
   
 glucose blood VI test strips strip Commonly known as:  ASCENSIA AUTODISC VI, ONE TOUCH ULTRA TEST VI Test 4 times per day Quantity:  100 Strip Refills:  6 Lancets Misc Commonly known as:  ONETOUCH ULTRASOFT LANCETS Test 4 times per day Quantity:  50 Each Refills:  11  
   
 metFORMIN  mg tablet Commonly known as:  GLUCOPHAGE XR Dose:  500 mg Take 1 Tab by mouth daily (with dinner). Quantity:  30 Tab Refills:  1 PNV no.24-iron-folic acid-dha -467 mg-mcg-mg Cmpk Commonly known as:  PRENATAL DHA+COMPLETE PRENATAL Dose:  1 Tab Take 1 Tab by mouth daily. OTC Quantity:  30 Tab Refills:  11 SLOW  mg (45 mg iron) ER tablet Generic drug:  ferrous sulfate Take  by mouth Daily (before breakfast). Refills:  0 Current Immunizations Name Date Influenza Vaccine (Quad) PF  Deferred () Surgery Information ID Date/Time Status Primary Surgeon All Procedures Location [id number] 4/1/2017 Complete   LATESHA SFE - DO NOT SCHEDULE Follow-up Information Follow up With Details Comments Contact Info Provider Unknown   Patient not available to ask Discharge Instructions Vaginal Childbirth: Care Instructions Your Care Instructions Your body will slowly heal in the next few weeks. It is easy to get too tired and overwhelmed during the first weeks after your baby is born. Changes in your hormones can shift your mood without warning. You may find it hard to meet the extra demands on your energy and time. Take it easy on yourself. Follow-up care is a key part of your treatment and safety. Be sure to make and go to all appointments, and call your doctor if you are having problems. It's also a good idea to know your test results and keep a list of the medicines you take. How can you care for yourself at home? · Vaginal bleeding and cramps ¨ After delivery, you will have a bloody discharge from the vagina. This will turn pink within a week and then white or yellow after about 10 days. It may last for 2 to 4 weeks or longer, until the uterus has healed. Use pads instead of tampons until you stop bleeding. ¨ Do not worry if you pass some blood clots, as long as they are smaller than a golf ball. If you have a tear or stitches in your vaginal area, change the pad at least every 4 hours to prevent soreness and infection. ¨ You may have cramps for the first few days after childbirth. These are normal and occur as the uterus shrinks to normal size. Take an over-the-counter pain medicine, such as acetaminophen (Tylenol), ibuprofen (Advil, Motrin), or naproxen (Aleve), for cramps. Read and follow all instructions on the label. Do not take aspirin, because it can cause more bleeding. ¨ Do not take two or more pain medicines at the same time unless the doctor told you to. Many pain medicines have acetaminophen, which is Tylenol. Too much acetaminophen (Tylenol) can be harmful. · Stitches ¨ If you have stitches, they will dissolve on their own and do not need to be removed. Follow your doctor's instructions for cleaning the stitched area.  
¨ Put ice or a cold pack on your painful area for 10 to 20 minutes at a time, several times a day, for the first few days. Put a thin cloth between the ice and your skin. ¨ Sit in a few inches of warm water (sitz bath) 3 times a day and after bowel movements. The warm water helps with pain and itching. If you do not have a tub, a warm shower might help. · Breast fullness ¨ Your breasts may overfill (engorge) in the first few days after delivery. To help milk flow and to relieve pain, warm your breasts in the shower or by using warm, moist towels before nursing. ¨ If you are not nursing, do not put warmth on your breasts or touch your breasts. Wear a tight bra or sports bra and use ice until the fullness goes away. This usually takes 2 to 3 days. ¨ Put ice or a cold pack on your breast after nursing to reduce swelling and pain. Put a thin cloth between the ice and your skin. · Activity ¨ Eat a balanced diet. Do not try to lose weight by cutting calories. Keep taking your prenatal vitamins, or take a multivitamin. ¨ Get as much rest as you can. Try to take naps when your baby sleeps during the day. ¨ Get some exercise every day. But do not do any heavy exercise until your doctor says it is okay. ¨ Wait until you are healed (about 4 to 6 weeks) before you have sexual intercourse. Your doctor will tell you when it is okay to have sex. ¨ Talk to your doctor about birth control. You can get pregnant even before your period returns. Also, you can get pregnant while you are breastfeeding. · Mental health ¨ It is normal to have some sadness, anxiety, sleeplessness, and mood swings after you go home. If you feel upset or hopeless for more than a few days or are having trouble doing the things you need to do, talk to your doctor. · Constipation and hemorrhoids ¨ Drink plenty of fluids, enough so that your urine is light yellow or clear like water.  If you have kidney, heart, or liver disease and have to limit fluids, talk with your doctor before you increase the amount of fluids you drink. ¨ Eat plenty of fiber each day. Have a bran muffin or bran cereal for breakfast, and try eating a piece of fruit for a mid-afternoon snack. ¨ For painful, itchy hemorrhoids, put ice or a cold pack on the area several times a day for 10 minutes at a time. Follow this by putting a warm compress on the area for another 10 to 20 minutes or by sitting in a shallow, warm bath. When should you call for help? Call 911 anytime you think you may need emergency care. For example, call if: 
· You are thinking of hurting yourself, your baby, or anyone else. · You have sudden, severe pain in your belly. · You passed out (lost consciousness). Call your doctor now or seek immediate medical care if: 
· You have severe vaginal bleeding. · You are soaking through a pad each hour for 2 or more hours. · Your vaginal bleeding seems to be getting heavier or is still bright red 4 days after delivery. · You are dizzy or lightheaded, or you feel like you may faint. · You are vomiting or cannot keep fluids down. · You have a fever. · You have new or more belly pain. · You pass tissue (not just blood). · Your vaginal discharge smells bad. · Your belly feels tender or full and hard. · Your breasts are continuously painful or red. · You feel sad, anxious, or hopeless for more than a few days. Watch closely for changes in your health, and be sure to contact your doctor if you have any problems. Where can you learn more? Go to http://rebekah-nadja.info/. Enter T213 in the search box to learn more about \"Vaginal Childbirth: Care Instructions. \" Current as of: May 30, 2016 Content Version: 11.2 © 5331-0109 [employer]. Care instructions adapted under license by [employer] (which disclaims liability or warranty for this information).  If you have questions about a medical condition or this instruction, always ask your healthcare professional. [employer] disclaims any warranty or liability for your use of this information. DISCHARGE SUMMARY from Nurse The following personal items are in your possession at time of discharge: 
 
Dental Appliances: None Visual Aid: None Home Medications: Sent home Jewelry: None Clothing: At bedside Other Valuables: At bedside, Cell Phone Personal Items Sent to Safe: none PATIENT INSTRUCTIONS: 
 
After general anesthesia or intravenous sedation, for 24 hours or while taking prescription Narcotics: · Limit your activities · Do not drive and operate hazardous machinery · Do not make important personal or business decisions · Do  not drink alcoholic beverages · If you have not urinated within 8 hours after discharge, please contact your surgeon on call. Report the following to your surgeon: 
· Excessive pain, swelling, redness or odor of or around the surgical area · Temperature over 100.5 · Nausea and vomiting lasting longer than 4 hours or if unable to take medications · Any signs of decreased circulation or nerve impairment to extremity: change in color, persistent  numbness, tingling, coldness or increase pain · Any questions What to do at Home: *  Please give a list of your current medications to your Primary Care Provider. *  Please update this list whenever your medications are discontinued, doses are 
    changed, or new medications (including over-the-counter products) are added. *  Please carry medication information at all times in case of emergency situations. These are general instructions for a healthy lifestyle: No smoking/ No tobacco products/ Avoid exposure to second hand smoke Surgeon General's Warning:  Quitting smoking now greatly reduces serious risk to your health. Obesity, smoking, and sedentary lifestyle greatly increases your risk for illness A healthy diet, regular physical exercise & weight monitoring are important for maintaining a healthy lifestyle You may be retaining fluid if you have a history of heart failure or if you experience any of the following symptoms:  Weight gain of 3 pounds or more overnight or 5 pounds in a week, increased swelling in our hands or feet or shortness of breath while lying flat in bed. Please call your doctor as soon as you notice any of these symptoms; do not wait until your next office visit. Recognize signs and symptoms of STROKE: 
 
F-face looks uneven A-arms unable to move or move unevenly S-speech slurred or non-existent T-time-call 911 as soon as signs and symptoms begin-DO NOT go Back to bed or wait to see if you get better-TIME IS BRAIN. Warning Signs of HEART ATTACK Call 911 if you have these symptoms: 
? Chest discomfort. Most heart attacks involve discomfort in the center of the chest that lasts more than a few minutes, or that goes away and comes back. It can feel like uncomfortable pressure, squeezing, fullness, or pain. ? Discomfort in other areas of the upper body. Symptoms can include pain or discomfort in one or both arms, the back, neck, jaw, or stomach. ? Shortness of breath with or without chest discomfort. ? Other signs may include breaking out in a cold sweat, nausea, or lightheadedness. Don't wait more than five minutes to call [address]! Fast action can save your life. Calling 911 is almost always the fastest way to get lifesaving treatment. Emergency Medical Services staff can begin treatment when they arrive  up to an hour sooner than if someone gets to the hospital by car. The discharge information has been reviewed with the patient. The patient verbalized understanding. Discharge medications reviewed with the patient and appropriate educational materials and side effects teaching were provided. Chart Review Routing History No Routing History on File

## 2017-03-31 NOTE — PROGRESS NOTES
Pre assessment call completed; Pt ID and allergies verified. Pt instructed to arrive to L&D at 1930(per charge rn) tonight. Pt also instructed to eat prior to arrival. Opportunity given for questions. Pt verbalized understanding of all instructions.

## 2017-03-31 NOTE — IP AVS SNAPSHOT
Current Discharge Medication List  
  
START taking these medications Dose & Instructions Dispensing Information Comments Morning Noon Evening Bedtime  
 oxyCODONE IR 5 mg immediate release tablet Commonly known as:  Mauricio Ga Your last dose was: Your next dose is:    
   
   
 Dose:  5 mg Take 1 Tab by mouth every four (4) hours as needed. Max Daily Amount: 30 mg.  
 Quantity:  15 Tab Refills:  0 ASK your doctor about these medications Dose & Instructions Dispensing Information Comments Morning Noon Evening Bedtime Blood-Glucose Meter monitoring kit Commonly known as:  Courtney Kovacs Your last dose was: Your next dose is:    
   
   
 Test 4 times a day Quantity:  1 Kit Refills:  0  
     
   
   
   
  
 calcium carbonate 200 mg calcium (500 mg) Chew Commonly known as:  TUMS Your last dose was: Your next dose is:    
   
   
 Dose:  1 Tab Take 1 Tab by mouth daily. Indications: as needed Refills:  0  
     
   
   
   
  
 glucose blood VI test strips strip Commonly known as:  ASCENSIA AUTODISC VI, ONE TOUCH ULTRA TEST VI Your last dose was: Your next dose is:    
   
   
 Test 4 times per day Quantity:  100 Strip Refills:  6 Lancets Misc Commonly known as:  ONETOUCH ULTRASOFT LANCETS Your last dose was: Your next dose is:    
   
   
 Test 4 times per day Quantity:  50 Each Refills:  11  
     
   
   
   
  
 metFORMIN  mg tablet Commonly known as:  GLUCOPHAGE XR Your last dose was: Your next dose is:    
   
   
 Dose:  500 mg Take 1 Tab by mouth daily (with dinner). Quantity:  30 Tab Refills:  1 PNV no.24-iron-folic acid-dha -465 mg-mcg-mg Cmpk Commonly known as:  PRENATAL DHA+COMPLETE PRENATAL Your last dose was: Your next dose is: Dose:  1 Tab Take 1 Tab by mouth daily. OTC Quantity:  30 Tab Refills:  11 SLOW  mg (45 mg iron) ER tablet Generic drug:  ferrous sulfate Your last dose was: Your next dose is: Take  by mouth Daily (before breakfast). Refills:  0 Where to Get Your Medications Information on where to get these meds will be given to you by the nurse or doctor. ! Ask your nurse or doctor about these medications  
  oxyCODONE IR 5 mg immediate release tablet

## 2017-03-31 NOTE — IP AVS SNAPSHOT
[address] 
[phone] Patient: Ronda Sharma MRN: [id number] :1995 You are allergic to the following No active allergies Immunizations Administered for This Admission Name Date Influenza Vaccine (Quad) PF  Deferred () Recent Documentation Height Weight Breastfeeding? BMI OB Status Smoking Status 1.626 m 83.9 kg Unknown 31.76 kg/m2 Recent pregnancy Former Smoker Emergency Contacts Name Discharge Info Relation Home Work Mobile Francesca Marie  Mother [14] [phone] Judy Gutierrez  Boyfriend [17] [phone] About your hospitalization You were admitted on:  2017 You last received care in the:  [address] You were discharged on:  2017 Unit phone number:  [phone] Why you were hospitalized Your primary diagnosis was:  Large For Dates Your diagnoses also included:  39 Weeks Gestation Of Pregnancy, Polyhydramnios, Gestational Diabetes, Genital Warts, Chorioamnionitis In Third Trimester, Secondary Postpartum Hemorrhage, Iron Deficiency Anemia, Group B Streptococcal Bacteriuria, Supervision Of Low-Risk Pregnancy, White Classification A2 Gestational Diabetes Mellitus, Scoliosis Providers Seen During Your Hospitalizations Provider Role Specialty Primary office phone Dominick Harmon MD Attending Provider Obstetrics & Gynecology [phone] Your Primary Care Physician (PCP) Primary Care Physician Office Phone Office Fax UNKNOWN, PROVIDER ** None ** ** None ** Follow-up Information Follow up With Details Comments Contact Info Provider Unknown   Patient not available to ask Current Discharge Medication List  
  
START taking these medications Dose & Instructions Dispensing Information Comments Morning Noon Evening Bedtime oxyCODONE IR 5 mg immediate release tablet Commonly known as:  Mary Post Your last dose was: Your next dose is:    
   
   
 Dose:  5 mg Take 1 Tab by mouth every four (4) hours as needed. Max Daily Amount: 30 mg.  
 Quantity:  15 Tab Refills:  0 ASK your doctor about these medications Dose & Instructions Dispensing Information Comments Morning Noon Evening Bedtime Blood-Glucose Meter monitoring kit Commonly known as:  [facility] Earlier [facility] Your last dose was: Your next dose is:    
   
   
 Test 4 times a day Quantity:  1 Kit Refills:  0  
     
   
   
   
  
 calcium carbonate 200 mg calcium (500 mg) Chew Commonly known as:  TUMS Your last dose was: Your next dose is:    
   
   
 Dose:  1 Tab Take 1 Tab by mouth daily. Indications: as needed Refills:  0  
     
   
   
   
  
 glucose blood VI test strips strip Commonly known as:  ASCENSIA AUTODISC VI, ONE TOUCH ULTRA TEST VI Your last dose was: Your next dose is:    
   
   
 Test 4 times per day Quantity:  100 Strip Refills:  6 Lancets Misc Commonly known as:  ONETOUCH ULTRASOFT LANCETS Your last dose was: Your next dose is:    
   
   
 Test 4 times per day Quantity:  50 Each Refills:  11  
     
   
   
   
  
 metFORMIN  mg tablet Commonly known as:  GLUCOPHAGE XR Your last dose was: Your next dose is:    
   
   
 Dose:  500 mg Take 1 Tab by mouth daily (with dinner). Quantity:  30 Tab Refills:  1 PNV no.24-iron-folic acid-dha -239 mg-mcg-mg Cmpk Commonly known as:  PRENATAL DHA+COMPLETE PRENATAL Your last dose was: Your next dose is:    
   
   
 Dose:  1 Tab Take 1 Tab by mouth daily. OTC Quantity:  30 Tab Refills:  11 SLOW  mg (45 mg iron) ER tablet Generic drug:  ferrous sulfate Your last dose was: Your next dose is: Take  by mouth Daily (before breakfast). Refills:  0 Where to Get Your Medications Information on where to get these meds will be given to you by the nurse or doctor. ! Ask your nurse or doctor about these medications  
  oxyCODONE IR 5 mg immediate release tablet Discharge Instructions Vaginal Childbirth: Care Instructions Your Care Instructions Your body will slowly heal in the next few weeks. It is easy to get too tired and overwhelmed during the first weeks after your baby is born. Changes in your hormones can shift your mood without warning. You may find it hard to meet the extra demands on your energy and time. Take it easy on yourself. Follow-up care is a key part of your treatment and safety. Be sure to make and go to all appointments, and call your doctor if you are having problems. It's also a good idea to know your test results and keep a list of the medicines you take. How can you care for yourself at home? · Vaginal bleeding and cramps ¨ After delivery, you will have a bloody discharge from the vagina. This will turn pink within a week and then white or yellow after about 10 days. It may last for 2 to 4 weeks or longer, until the uterus has healed. Use pads instead of tampons until you stop bleeding. ¨ Do not worry if you pass some blood clots, as long as they are smaller than a golf ball. If you have a tear or stitches in your vaginal area, change the pad at least every 4 hours to prevent soreness and infection. ¨ You may have cramps for the first few days after childbirth. These are normal and occur as the uterus shrinks to normal size. Take an over-the-counter pain medicine, such as acetaminophen (Tylenol), ibuprofen (Advil, Motrin), or naproxen (Aleve), for cramps. Read and follow all instructions on the label.  Do not take aspirin, because it can cause more bleeding. ¨ Do not take two or more pain medicines at the same time unless the doctor told you to. Many pain medicines have acetaminophen, which is Tylenol. Too much acetaminophen (Tylenol) can be harmful. · Stitches ¨ If you have stitches, they will dissolve on their own and do not need to be removed. Follow your doctor's instructions for cleaning the stitched area. ¨ Put ice or a cold pack on your painful area for 10 to 20 minutes at a time, several times a day, for the first few days. Put a thin cloth between the ice and your skin. ¨ Sit in a few inches of warm water (sitz bath) 3 times a day and after bowel movements. The warm water helps with pain and itching. If you do not have a tub, a warm shower might help. · Breast fullness ¨ Your breasts may overfill (engorge) in the first few days after delivery. To help milk flow and to relieve pain, warm your breasts in the shower or by using warm, moist towels before nursing. ¨ If you are not nursing, do not put warmth on your breasts or touch your breasts. Wear a tight bra or sports bra and use ice until the fullness goes away. This usually takes 2 to 3 days. ¨ Put ice or a cold pack on your breast after nursing to reduce swelling and pain. Put a thin cloth between the ice and your skin. · Activity ¨ Eat a balanced diet. Do not try to lose weight by cutting calories. Keep taking your prenatal vitamins, or take a multivitamin. ¨ Get as much rest as you can. Try to take naps when your baby sleeps during the day. ¨ Get some exercise every day. But do not do any heavy exercise until your doctor says it is okay. ¨ Wait until you are healed (about 4 to 6 weeks) before you have sexual intercourse. Your doctor will tell you when it is okay to have sex. ¨ Talk to your doctor about birth control. You can get pregnant even before your period returns. Also, you can get pregnant while you are breastfeeding. · Mental health ¨ It is normal to have some sadness, anxiety, sleeplessness, and mood swings after you go home. If you feel upset or hopeless for more than a few days or are having trouble doing the things you need to do, talk to your doctor. · Constipation and hemorrhoids ¨ Drink plenty of fluids, enough so that your urine is light yellow or clear like water. If you have kidney, heart, or liver disease and have to limit fluids, talk with your doctor before you increase the amount of fluids you drink. ¨ Eat plenty of fiber each day. Have a bran muffin or bran cereal for breakfast, and try eating a piece of fruit for a mid-afternoon snack. ¨ For painful, itchy hemorrhoids, put ice or a cold pack on the area several times a day for 10 minutes at a time. Follow this by putting a warm compress on the area for another 10 to 20 minutes or by sitting in a shallow, warm bath. When should you call for help? Call 911 anytime you think you may need emergency care. For example, call if: 
· You are thinking of hurting yourself, your baby, or anyone else. · You have sudden, severe pain in your belly. · You passed out (lost consciousness). Call your doctor now or seek immediate medical care if: 
· You have severe vaginal bleeding. · You are soaking through a pad each hour for 2 or more hours. · Your vaginal bleeding seems to be getting heavier or is still bright red 4 days after delivery. · You are dizzy or lightheaded, or you feel like you may faint. · You are vomiting or cannot keep fluids down. · You have a fever. · You have new or more belly pain. · You pass tissue (not just blood). · Your vaginal discharge smells bad. · Your belly feels tender or full and hard. · Your breasts are continuously painful or red. · You feel sad, anxious, or hopeless for more than a few days. Watch closely for changes in your health, and be sure to contact your doctor if you have any problems. Where can you learn more? Go to http://rebekah-nadja.info/. Enter E389 in the search box to learn more about \"Vaginal Childbirth: Care Instructions. \" Current as of: May 30, 2016 Content Version: 11.2 © 6731-2945 [employer]. Care instructions adapted under license by [employer] (which disclaims liability or warranty for this information). If you have questions about a medical condition or this instruction, always ask your healthcare professional. [place] [address] any warranty or liability for your use of this information. DISCHARGE SUMMARY from Nurse The following personal items are in your possession at time of discharge: 
 
Dental Appliances: None Visual Aid: None Home Medications: Sent home Jewelry: None Clothing: At bedside Other Valuables: At bedside, Cell Phone Personal Items Sent to Safe: none PATIENT INSTRUCTIONS: 
 
After general anesthesia or intravenous sedation, for 24 hours or while taking prescription Narcotics: · Limit your activities · Do not drive and operate hazardous machinery · Do not make important personal or business decisions · Do  not drink alcoholic beverages · If you have not urinated within 8 hours after discharge, please contact your surgeon on call. Report the following to your surgeon: 
· Excessive pain, swelling, redness or odor of or around the surgical area · Temperature over 100.5 · Nausea and vomiting lasting longer than 4 hours or if unable to take medications · Any signs of decreased circulation or nerve impairment to extremity: change in color, persistent  numbness, tingling, coldness or increase pain · Any questions What to do at Home: *  Please give a list of your current medications to your Primary Care Provider. *  Please update this list whenever your medications are discontinued, doses are 
    changed, or new medications (including over-the-counter products) are added. *  Please carry medication information at all times in case of emergency situations. These are general instructions for a healthy lifestyle: No smoking/ No tobacco products/ Avoid exposure to second hand smoke Surgeon General's Warning:  Quitting smoking now greatly reduces serious risk to your health. Obesity, smoking, and sedentary lifestyle greatly increases your risk for illness A healthy diet, regular physical exercise & weight monitoring are important for maintaining a healthy lifestyle You may be retaining fluid if you have a history of heart failure or if you experience any of the following symptoms:  Weight gain of 3 pounds or more overnight or 5 pounds in a week, increased swelling in our hands or feet or shortness of breath while lying flat in bed. Please call your doctor as soon as you notice any of these symptoms; do not wait until your next office visit. Recognize signs and symptoms of STROKE: 
 
F-face looks uneven A-arms unable to move or move unevenly S-speech slurred or non-existent T-time-call 911 as soon as signs and symptoms begin-DO NOT go Back to bed or wait to see if you get better-TIME IS BRAIN. Warning Signs of HEART ATTACK Call 911 if you have these symptoms: 
? Chest discomfort. Most heart attacks involve discomfort in the center of the chest that lasts more than a few minutes, or that goes away and comes back. It can feel like uncomfortable pressure, squeezing, fullness, or pain. ? Discomfort in other areas of the upper body. Symptoms can include pain or discomfort in one or both arms, the back, neck, jaw, or stomach. ? Shortness of breath with or without chest discomfort. ? Other signs may include breaking out in a cold sweat, nausea, or lightheadedness. Don't wait more than five minutes to call [address]! Fast action can save your life.  Calling 911 is almost always the fastest way to get lifesaving treatment. Emergency Medical Services staff can begin treatment when they arrive  up to an hour sooner than if someone gets to the hospital by car. The discharge information has been reviewed with the patient. The patient verbalized understanding. Discharge medications reviewed with the patient and appropriate educational materials and side effects teaching were provided. Discharge Orders None [facility] Announcement We are excited to announce that we are making your provider's discharge notes available to you in [employer] You will see these notes when they are completed and signed by the physician that discharged you from your recent hospital stay. If you have any questions or concerns about any information you see in [employer]), please call the Health Information Department where you were seen or reach out to your Primary Care Provider for more information about your plan of care. Introducing [facility]! Dear Arthur Cochran: Thank you for requesting a [employer] account. Our records indicate that you already have an active [employer] account. You can access your account anytime at [email or web address] Did you know that you can access your hospital and ER discharge instructions at any time in [employer])? You can also review all of your test results from your hospital stay or ER visit. Additional Information If you have questions, please visit the Frequently Asked Questions section of the [employer] website at [email or web address] Remember, [employer] is NOT to be used for urgent needs. For medical emergencies, dial 911. Now available from your iPhone and Android! General Information Please provide this summary of care documentation to your next provider. Patient Signature:  ____________________________________________________________ Date:  ____________________________________________________________  
  
Lajuanda Deepthi Provider Signature:  ____________________________________________________________ Date:  ____________________________________________________________

## 2017-04-01 ENCOUNTER — ANESTHESIA (OUTPATIENT)
Dept: LABOR AND DELIVERY | Age: 22
End: 2017-04-01
Payer: COMMERCIAL

## 2017-04-01 ENCOUNTER — ANESTHESIA EVENT (OUTPATIENT)
Dept: LABOR AND DELIVERY | Age: 22
End: 2017-04-01
Payer: COMMERCIAL

## 2017-04-01 LAB
GLUCOSE BLD STRIP.AUTO-MCNC: 75 MG/DL (ref 65–100)
GLUCOSE BLD STRIP.AUTO-MCNC: 77 MG/DL (ref 65–100)
GLUCOSE BLD STRIP.AUTO-MCNC: 88 MG/DL (ref 65–100)
GLUCOSE BLD STRIP.AUTO-MCNC: 90 MG/DL (ref 65–100)
GLUCOSE BLD STRIP.AUTO-MCNC: 98 MG/DL (ref 65–100)

## 2017-04-01 PROCEDURE — 74011000258 HC RX REV CODE- 258: Performed by: OBSTETRICS & GYNECOLOGY

## 2017-04-01 PROCEDURE — 77030005518 HC CATH URETH FOL 2W BARD -B

## 2017-04-01 PROCEDURE — A4300 CATH IMPL VASC ACCESS PORTAL: HCPCS | Performed by: ANESTHESIOLOGY

## 2017-04-01 PROCEDURE — 82962 GLUCOSE BLOOD TEST: CPT

## 2017-04-01 PROCEDURE — 65270000029 HC RM PRIVATE

## 2017-04-01 PROCEDURE — 4A1HXCZ MONITORING OF PRODUCTS OF CONCEPTION, CARDIAC RATE, EXTERNAL APPROACH: ICD-10-PCS | Performed by: OBSTETRICS & GYNECOLOGY

## 2017-04-01 PROCEDURE — 74011250637 HC RX REV CODE- 250/637: Performed by: OBSTETRICS & GYNECOLOGY

## 2017-04-01 PROCEDURE — 59200 INSERT CERVICAL DILATOR: CPT

## 2017-04-01 PROCEDURE — 77030014125 HC TY EPDRL BBMI -B: Performed by: ANESTHESIOLOGY

## 2017-04-01 PROCEDURE — 74011250636 HC RX REV CODE- 250/636: Performed by: OBSTETRICS & GYNECOLOGY

## 2017-04-01 PROCEDURE — 74011258636 HC RX REV CODE- 258/636: Performed by: OBSTETRICS & GYNECOLOGY

## 2017-04-01 RX ORDER — MISOPROSTOL 100 UG/1
25 TABLET ORAL ONCE
Status: COMPLETED | OUTPATIENT
Start: 2017-04-01 | End: 2017-04-01

## 2017-04-01 RX ORDER — MISOPROSTOL 100 UG/1
25 TABLET ORAL EVERY 4 HOURS
Status: DISCONTINUED | OUTPATIENT
Start: 2017-04-01 | End: 2017-04-01

## 2017-04-01 RX ORDER — ROPIVACAINE HYDROCHLORIDE 2 MG/ML
INJECTION, SOLUTION EPIDURAL; INFILTRATION; PERINEURAL
Status: DISCONTINUED | OUTPATIENT
Start: 2017-04-01 | End: 2017-04-02 | Stop reason: HOSPADM

## 2017-04-01 RX ADMIN — ROPIVACAINE HYDROCHLORIDE: 2 INJECTION, SOLUTION EPIDURAL; INFILTRATION; PERINEURAL at 18:23

## 2017-04-01 RX ADMIN — OXYTOCIN 2 MILLI-UNITS/MIN: 10 INJECTION, SOLUTION INTRAMUSCULAR; INTRAVENOUS at 06:43

## 2017-04-01 RX ADMIN — SODIUM CHLORIDE, SODIUM LACTATE, POTASSIUM CHLORIDE, CALCIUM CHLORIDE, AND DEXTROSE MONOHYDRATE 125 ML/HR: 600; 310; 30; 20; 5 INJECTION, SOLUTION INTRAVENOUS at 23:03

## 2017-04-01 RX ADMIN — MISOPROSTOL 25 MCG: 100 TABLET ORAL at 15:05

## 2017-04-01 RX ADMIN — SODIUM CHLORIDE, SODIUM LACTATE, POTASSIUM CHLORIDE, CALCIUM CHLORIDE, AND DEXTROSE MONOHYDRATE 125 ML/HR: 600; 310; 30; 20; 5 INJECTION, SOLUTION INTRAVENOUS at 14:40

## 2017-04-01 RX ADMIN — SODIUM CHLORIDE 5 MILLION UNITS: 900 INJECTION, SOLUTION INTRAVENOUS at 06:36

## 2017-04-01 RX ADMIN — PENICILLIN G POTASSIUM 2.5 MILLION UNITS: 20000000 POWDER, FOR SOLUTION INTRAVENOUS at 15:00

## 2017-04-01 RX ADMIN — SODIUM CHLORIDE, SODIUM LACTATE, POTASSIUM CHLORIDE, CALCIUM CHLORIDE, AND DEXTROSE MONOHYDRATE 125 ML/HR: 600; 310; 30; 20; 5 INJECTION, SOLUTION INTRAVENOUS at 06:37

## 2017-04-01 RX ADMIN — PENICILLIN G POTASSIUM 2.5 MILLION UNITS: 20000000 POWDER, FOR SOLUTION INTRAVENOUS at 10:45

## 2017-04-01 RX ADMIN — MISOPROSTOL 25 MCG: 100 TABLET ORAL at 11:05

## 2017-04-01 RX ADMIN — Medication 10 ML: at 06:37

## 2017-04-01 RX ADMIN — PENICILLIN G POTASSIUM 2.5 MILLION UNITS: 20000000 POWDER, FOR SOLUTION INTRAVENOUS at 19:20

## 2017-04-01 RX ADMIN — PENICILLIN G POTASSIUM 2.5 MILLION UNITS: 20000000 POWDER, FOR SOLUTION INTRAVENOUS at 23:01

## 2017-04-01 RX ADMIN — OXYTOCIN 6 MILLI-UNITS/MIN: 10 INJECTION, SOLUTION INTRAMUSCULAR; INTRAVENOUS at 07:45

## 2017-04-01 RX ADMIN — ROPIVACAINE HYDROCHLORIDE 8 ML/HR: 2 INJECTION, SOLUTION EPIDURAL; INFILTRATION; PERINEURAL at 09:29

## 2017-04-01 NOTE — H&P
History & Physical    Name: Hermes Madrid MRN: [id number]  SSN: [id number]    YOB: 1995  Age: 24 y.o. Sex: female      Subjective:     Estimated Date of Delivery: 17  OB History    Para Term  AB SAB TAB Ectopic Multiple Living   1 0 0 0 0 0 0 0 0 0      # Outcome Date GA Lbr Arnel/2nd Weight Sex Delivery Anes PTL Lv   1 Current                   Ms. Christiano Ramirez is admitted with pregnancy at 39w1d for induction of labor due to Gestational Diabetes on Metformin, LGA, Poly. Growth US yesterday w/ GP 97% (9#7), AC >99%, OZZY 20 but 10cm pocket of fluid, bpp , vertex. Please see prenatal records for details. Denies painful regular ctxs, LOF, VB. Good FM. Past Medical History:   Diagnosis Date    Fetal hydronephrosis in pregnancy, antepartum condition 2016    Gestational diabetes 3/31/2017    Iron deficiency anemia 2017    Scoliosis      History reviewed. No pertinent surgical history. Social History     Occupational History    Not on file. Social History Main Topics    Smoking status: Former Smoker     Packs/day: 1.50     Quit date: 2016    Smokeless tobacco: Never Used    Alcohol use No    Drug use: No    Sexual activity: Yes     Partners: Male     Birth control/ protection: None     Family History   Problem Relation Age of Onset    Hypertension Father        No Known Allergies  Prior to Admission medications    Medication Sig Start Date End Date Taking? Authorizing Provider   glucose blood VI test strips (ASCENSIA AUTODISC VI, ONE TOUCH ULTRA TEST VI) strip Test 4 times per day 3/8/17  Yes Rhiannon Stapleton MD   Blood-Glucose Meter [facility] [place]) monitoring kit Test 4 times a day 3/8/17  Yes Rhiannon Stapleton MD   Lancets (ONETOUCH ULTRASOFT LANCETS) misc Test 4 times per day 3/8/17  Yes Rhiannon Stapleton MD   metFORMIN ER (GLUCOPHAGE XR) 500 mg tablet Take 1 Tab by mouth daily (with dinner).  3/7/17  Yes Rhiannon Stapletno MD   PNV no.24-iron-folic acid-dha (PRENATAL DHA+COMPLETE PRENATAL) -300 mg-mcg-mg cmpk Take 1 Tab by mouth daily. OTC 2/13/17  Yes Charles Perrin MD   ferrous sulfate (SLOW FE) 142 mg (45 mg iron) ER tablet Take  by mouth Daily (before breakfast). Yes Historical Provider   calcium carbonate (TUMS) 200 mg calcium (500 mg) chew Take 1 Tab by mouth daily. Indications: as needed   Yes Historical Provider        Review of Systems: A comprehensive review of systems was negative except for that written in the History of Present Illness. Objective:     Vitals:  Vitals:    04/01/17 0626 04/01/17 0707 04/01/17 0720 04/01/17 0731   BP: 123/66 112/58 123/63 122/68   Pulse: (!) 108 90 91 99   Resp:       Temp:       Weight:       Height:            Physical Exam:  Constitutional: She appears well-developed and well-nourished. No distress. HENT:    Head: Normocephalic and atraumatic. Lungs: CTAB, effort normal  Cardiovascular: RRR, no M/R/G  Abd:  Gravid, NTTP   Skin: She is not diaphoretic. Psychiatric: She has a normal mood and affect.  Her behavior is normal. Thought content normal.     SVE: ft/thi/hi    FHTs: 135/mod/+accels/-decels  [place]:  Q3-5min       Prenatal Labs:   Lab Results   Component Value Date/Time    ABO/Rh(D) A POSITIVE 03/31/2017 09:33 PM    Rubella, External Immune 09/07/2016    HBsAg, External Negative 09/07/2016    HIV, External Negative 09/07/2016    RPR, External Negative 09/07/2016    ABO,Rh A positive 09/07/2016    GrBStrep, External positive  09/12/2016    GrBStrep, External POSITIVE  09/12/2016       Impression/Plan:     Principal Problem:    Large for dates (3/31/2017)    Active Problems:    Polyhydramnios (3/7/2017)      Overview: 2/21/17 Growth US w/ MFM:  GP 65%, AC 79%, OZZY wnl            3/7/17:      GP 75% (6#14), AC 97%, OZZY 24 (borderline Poly), bpp 8/8       Transverse lie      Passed glucola (106), BPP NV and check OZZY       Repeat growth 2-3wks       Rx Metformin       BPP Qwk 3/31:  GP 97% (9#7), AC >99%, OZZY 20 but 10cm pocket (Poly), vertex, bpp       8/8 -->IOL tonight       Normal labor (3/31/2017)      39 weeks gestation of pregnancy (3/31/2017)      Diabetes mellitus affecting pregnancy in third trimester (3/31/2017)      Gestational diabetes (3/31/2017)         Plan: Admit for induction of labor. Group B Strep positive, will treat prophylactically with penicillin. S/p Cervidil overnight--cervix still unfavorable. S/p Pit for 2hrs. Will DC pit and do VMP +FB for better cervical ripening. Early ANTOINE if desires given FB placement. Ts Cat I, R.       Signed By:  Leah Anaya MD     April 1, 2017

## 2017-04-01 NOTE — PROGRESS NOTES
Attempted to placed patient on [facility] for better fetal monitoring. Monitors not picking up FHT effectively. Will switch back over to US/TOCO monitoring.

## 2017-04-01 NOTE — PROGRESS NOTES
Attempted two [place] monitoring systems, unsuccessful in maintaining continuous monitoring. US/TOCO reapplied, continuous monitoring effectively achieved at this time. FHT reassuring.

## 2017-04-01 NOTE — PROGRESS NOTES
In to check on patient at this time. Pt states she patterson not feel the contractions and only feels \"some tightening occasionally, contractions aren't painful\". Will continue to monitor.

## 2017-04-01 NOTE — PROGRESS NOTES
Labor Progress Note Continue Labor  Patient seen, fetal heart rate and contraction pattern evaluated, patient examined. Comfortable s/p ANTOINE. Patient Vitals for the past 8 hrs:   BP Temp Pulse   17 0952 103/55 - (!) 121   17 0950 114/66 - (!) 115   17 0949 122/63 - (!) 111   17 0948 120/65 - (!) 115   17 0947 118/59 - (!) 121   17 0946 118/57 - (!) 109   17 0945 133/60 - (!) 111   17 0944 113/56 - (!) 120   17 0943 116/55 - (!) 113   17 0942 123/58 - (!) 118   17 0941 118/60 - (!) 118   17 0938 120/60 - (!) 111   17 0937 121/55 - (!) 104   17 0936 126/60 - (!) 102   17 0935 126/63 - (!) 109   17 0934 123/68 - 100   17 0933 122/72 - (!) 109   17 0932 121/65 - 100   17 0931 126/63 - 87   17 0929 129/66 - 93   17 0928 131/70 - 94   17 0927 135/72 - (!) 106   17 0925 134/75 - (!) 103   17 0917 135/75 - 93   17 0916 135/75 - 93   17 0900 - 98 °F (36.7 °C) -   17 0848 119/64 - 81   17 0833 117/73 - 88   17 0816 120/59 - 84   17 0801 118/62 - (!) 107   17 0746 117/66 - 97   17 0731 122/68 - 99   17 0720 123/63 - 91   17 0707 112/58 - 90   17 0626 123/66 - (!) 108       FHTs:  140/mod/+accels/-decels    [place]:  Q2-4min    SVE:  /hi     Assessment/Plan:  21 y. o. at 39w1d for IOL due to GDM, LGA, Poly:    1) IOL: Pit off for >30min; FB placed w/out complication.   Will do buccal cytotec soon  2) FHTs:  Cat I, R   3) GBS +:  PCN

## 2017-04-01 NOTE — ANESTHESIA PROCEDURE NOTES
Epidural Block    Start time: 4/1/2017 9:12 AM  End time: 4/1/2017 9:31 AM  Performed by: Todd Cross by: Daphne Terry     Pre-Procedure  Preanesthetic Checklist: patient identified, risks and benefits discussed, anesthesia consent, site marked, patient being monitored, timeout performed and anesthesia consent    Timeout Time: 09:16        Epidural:   Patient position:  Seated  Prep region:  Lumbar  Prep: Chlorhexidine    Location:  L3-4    Needle and Epidural Catheter:   Needle Type:  Tuohy  Needle Gauge:  17 G  Injection Technique:  Loss of resistance using air  Attempts:  1  Catheter Size:  19 G  Catheter at Skin Depth (cm):  10  Depth in Epidural Space (cm):  4  Test Dose:  Lidocaine 1.5% w/ epi and negative (Negative test dose at 0926)    Assessment:   Catheter Secured:  Tegaderm and tape  Insertion:  Uncomplicated  Patient tolerance:  Patient tolerated the procedure well with no immediate complications

## 2017-04-01 NOTE — PROGRESS NOTES
Cervidil removed at [address] [place] [address]. SVE performed. Fingertip/40-50%eff/-3 station. No bleeding or discharge noted. Pt off monitors and up to bathroom to shower at this time. Will continue to monitor.

## 2017-04-01 NOTE — PROGRESS NOTES
Nutrition: MST referral received for pt's diagnosis of gestational diabetes. Pt currently being induced and is NPO. No other nutrition risk factors identified. Follow up per standards of care.  [place] [address], [address], -[phone]

## 2017-04-01 NOTE — PROGRESS NOTES
Pt remains comfortable with her epidural. Ctxs are every 2-4 mins and palpate moderate. Fetal movement felt at this time. Spence bulb remains in place after pulling on it. Pericare done at this time and pt turned to left side. Buccal Cytotec given as ordered.

## 2017-04-01 NOTE — ANESTHESIA PREPROCEDURE EVALUATION
Anesthetic History   No history of anesthetic complications            Review of Systems / Medical History  Patient summary reviewed, nursing notes reviewed and pertinent labs reviewed    Pulmonary  Within defined limits                 Neuro/Psych   Within defined limits           Cardiovascular                  Exercise tolerance: >4 METS     GI/Hepatic/Renal  Within defined limits              Endo/Other    Diabetes: well controlled, type 2    Anemia     Other Findings   Comments: Gestational diabetes, on metformin    ANA           Physical Exam    Airway  Mallampati: I  TM Distance: 4 - 6 cm  Neck ROM: normal range of motion   Mouth opening: Normal     Cardiovascular    Rhythm: regular           Dental  No notable dental hx       Pulmonary  Breath sounds clear to auscultation               Abdominal  GI exam deferred       Other Findings            Anesthetic Plan    ASA: 3  Anesthesia type: epidural            Anesthetic plan and risks discussed with: Patient and Spouse

## 2017-04-01 NOTE — PROGRESS NOTES
Labor Progress Note Continue Labor  Patient seen, fetal heart rate and contraction pattern evaluated, patient examined. Comfortable s/p ANTOINE but beginning to feel more pressure. Patient Vitals for the past 8 hrs:   BP Temp Pulse   17 1515 106/54 - 90   17 1444 100/55 - 80   17 1412 100/55 - 79   17 1343 107/52 - 81   17 1314 103/59 - 80   17 1242 94/51 - 75   17 1229 102/56 - 80   17 1213 101/56 - 78   17 1157 100/57 - 80   17 1142 102/58 - 74   17 1129 106/59 - 75   17 1113 110/58 - 85   17 1058 117/58 - 81   17 1043 107/56 - 88   17 1028 118/62 - (!) 104   17 1014 110/64 - 90   17 0952 103/55 - (!) 121   17 0950 114/66 - (!) 115   17 0949 122/63 - (!) 111   17 0948 120/65 - (!) 115   17 0947 118/59 - (!) 121   17 0946 118/57 - (!) 109   17 0945 133/60 - (!) 111   17 0944 113/56 - (!) 120   17 0943 116/55 - (!) 113   17 0942 123/58 - (!) 118   17 0941 118/60 - (!) 118   17 0938 120/60 - (!) 111   17 0937 121/55 - (!) 104   17 0936 126/60 - (!) 102   17 0935 126/63 - (!) 109   17 0934 123/68 - 100   17 0933 122/72 - (!) 109   17 0932 121/65 - 100   17 0931 126/63 - 87   17 0929 129/66 - 93   17 0928 131/70 - 94   17 0927 135/72 - (!) 106   17 0925 134/75 - (!) 103   17 0917 135/75 - 93   17 0916 135/75 - 93   17 0900 - 98 °F (36.7 °C) -         FHTs:  150/mod/+accels  [place]: Q2-5min    SVE:  FB out,  (floppy)/-2, AROM large amount of clear fluid     Assessment/Plan:  21 y. o. at 39w1d for IOL due to GDM, LGA, poly:    1) IOL:  S/p cervidil, FB and cytotec x2; Arom this check; will start Pit 4hrs after last dose of cytotec PRN  2) FHTs:  Cat I, R   3) GDM:  FS all <120; will check Q2hrs in AL

## 2017-04-01 NOTE — PROGRESS NOTES
Labor Progress Note Continue Labor  Fetal heart rate and contraction pattern evaluated. No complaints at this time. Patient Vitals for the past 8 hrs:   BP Temp Pulse   17 1343 107/52 - 81   17 1314 103/59 - 80   17 1242 94/51 - 75   17 1229 102/56 - 80   17 1213 101/56 - 78   17 1157 100/57 - 80   17 1142 102/58 - 74   17 1129 106/59 - 75   17 1113 110/58 - 85   17 1058 117/58 - 81   17 1043 107/56 - 88   17 1028 118/62 - (!) 104   17 1014 110/64 - 90   17 0952 103/55 - (!) 121   17 0950 114/66 - (!) 115   17 0949 122/63 - (!) 111   17 0948 120/65 - (!) 115   17 0947 118/59 - (!) 121   17 0946 118/57 - (!) 109   17 0945 133/60 - (!) 111   17 0944 113/56 - (!) 120   17 0943 116/55 - (!) 113   17 0942 123/58 - (!) 118   17 0941 118/60 - (!) 118   17 0938 120/60 - (!) 111   17 0937 121/55 - (!) 104   17 0936 126/60 - (!) 102   17 0935 126/63 - (!) 109   17 0934 123/68 - 100   17 0933 122/72 - (!) 109   17 0932 121/65 - 100   17 0931 126/63 - 87   17 0929 129/66 - 93   17 0928 131/70 - 94   17 0927 135/72 - (!) 106   17 0925 134/75 - (!) 103   17 0917 135/75 - 93   17 0916 135/75 - 93   17 0900 - 98 °F (36.7 °C) -   17 0848 119/64 - 81   17 0833 117/73 - 88   17 0816 120/59 - 84   17 0801 118/62 - (!) 107   17 0746 117/66 - 97   17 0731 122/68 - 99   17 0720 123/63 - 91   17 0707 112/58 - 90         FHTs:  150/mod/+accels/-decels  [place]: Q2-4min    SVE:  Deferred; FB in place; s/p RN pull w/ no results     Assessment/Plan:  24 y. o. at 39w1d for IOL due to GDM/LGA/Poly:    1) IOL:  S/p cervidil; FB in, cytotec Q4    2) FHTs:  Cat I, Kevin Mejia MD

## 2017-04-01 NOTE — PROGRESS NOTES
Pt admitted to room 428. Admission assessment completed, consents signed and witnessed. Discussed plan of care with patient and verbalized understanding. IV started. Labs drawn and sent. Reviewed \"pain\" goal with patient and realistic expectations of pain relief.

## 2017-04-02 PROBLEM — O41.1230 CHORIOAMNIONITIS IN THIRD TRIMESTER: Status: ACTIVE | Noted: 2017-04-02

## 2017-04-02 LAB
BASE DEFICIT BLDCOA-SCNC: 4.6 MMOL/L (ref 0–2)
BASE DEFICIT BLDCOV-SCNC: 4.4 MMOL/L (ref 1.9–7.7)
BDY SITE: ABNORMAL
BDY SITE: NORMAL
GLUCOSE BLD STRIP.AUTO-MCNC: 95 MG/DL (ref 65–100)
GLUCOSE BLD STRIP.AUTO-MCNC: 97 MG/DL (ref 65–100)
HCO3 BLDCOA-SCNC: 26 MMOL/L (ref 22–26)
HCO3 BLDV-SCNC: 21 MMOL/L
PCO2 BLDCOA: 75 MMHG (ref 33–49)
PCO2 BLDCOV: 39 MMHG (ref 14.1–43.3)
PH BLDCOA: 7.16 [PH] (ref 7.21–7.31)
PH BLDCOV: 7.34 [PH] (ref 7.2–7.44)
PO2 BLDCOA: 13 MMHG (ref 9–19)
PO2 BLDV: 32 MMHG (ref 30.4–57.2)
SERVICE CMNT-IMP: ABNORMAL
SERVICE CMNT-IMP: NORMAL

## 2017-04-02 PROCEDURE — 77030018846 HC SOL IRR STRL H20 ICUM -A

## 2017-04-02 PROCEDURE — 10907ZC DRAINAGE OF AMNIOTIC FLUID, THERAPEUTIC FROM PRODUCTS OF CONCEPTION, VIA NATURAL OR ARTIFICIAL OPENING: ICD-10-PCS | Performed by: OBSTETRICS & GYNECOLOGY

## 2017-04-02 PROCEDURE — 74011250636 HC RX REV CODE- 250/636

## 2017-04-02 PROCEDURE — 77030009363 HC CUP VAC EXTRCT CNCI -B

## 2017-04-02 PROCEDURE — 0UQMXZZ REPAIR VULVA, EXTERNAL APPROACH: ICD-10-PCS | Performed by: OBSTETRICS & GYNECOLOGY

## 2017-04-02 PROCEDURE — 74011250636 HC RX REV CODE- 250/636: Performed by: OBSTETRICS & GYNECOLOGY

## 2017-04-02 PROCEDURE — 74011250637 HC RX REV CODE- 250/637: Performed by: OBSTETRICS & GYNECOLOGY

## 2017-04-02 PROCEDURE — 75410000002 HC LABOR FEE PER 1 HR

## 2017-04-02 PROCEDURE — 77030010848 HC CATH INTUTR PRSS KOLB -B

## 2017-04-02 PROCEDURE — 82803 BLOOD GASES ANY COMBINATION: CPT

## 2017-04-02 PROCEDURE — 77010026065 HC OXYGEN MINIMUM MEDICAL AIR

## 2017-04-02 PROCEDURE — 77030011943

## 2017-04-02 PROCEDURE — 82962 GLUCOSE BLOOD TEST: CPT

## 2017-04-02 PROCEDURE — 76060000078 HC EPIDURAL ANESTHESIA

## 2017-04-02 PROCEDURE — 75410000000 HC DELIVERY VAGINAL/SINGLE

## 2017-04-02 PROCEDURE — 3E033VJ INTRODUCTION OF OTHER HORMONE INTO PERIPHERAL VEIN, PERCUTANEOUS APPROACH: ICD-10-PCS | Performed by: OBSTETRICS & GYNECOLOGY

## 2017-04-02 PROCEDURE — 74011000250 HC RX REV CODE- 250: Performed by: OBSTETRICS & GYNECOLOGY

## 2017-04-02 PROCEDURE — 77030003028 HC SUT VCRL J&J -A

## 2017-04-02 PROCEDURE — 77030011945 HC CATH URIN INT ST MENT -A

## 2017-04-02 PROCEDURE — 65270000029 HC RM PRIVATE

## 2017-04-02 PROCEDURE — 75410000003 HC RECOV DEL/VAG/CSECN EA 0.5 HR

## 2017-04-02 PROCEDURE — 77030028565 HC CATH CERV RIPNG BLN COOK -B

## 2017-04-02 RX ORDER — NALOXONE HYDROCHLORIDE 0.4 MG/ML
0.4 INJECTION, SOLUTION INTRAMUSCULAR; INTRAVENOUS; SUBCUTANEOUS AS NEEDED
Status: DISCONTINUED | OUTPATIENT
Start: 2017-04-02 | End: 2017-04-04 | Stop reason: HOSPADM

## 2017-04-02 RX ORDER — METHYLERGONOVINE MALEATE 0.2 MG/ML
INJECTION INTRAVENOUS
Status: COMPLETED
Start: 2017-04-02 | End: 2017-04-02

## 2017-04-02 RX ORDER — GENTAMICIN SULFATE 100 MG/100ML
100 INJECTION, SOLUTION INTRAVENOUS EVERY 8 HOURS
Status: DISCONTINUED | OUTPATIENT
Start: 2017-04-02 | End: 2017-04-03

## 2017-04-02 RX ORDER — ROPIVACAINE HYDROCHLORIDE 2 MG/ML
INJECTION, SOLUTION EPIDURAL; INFILTRATION; PERINEURAL AS NEEDED
Status: DISCONTINUED | OUTPATIENT
Start: 2017-04-02 | End: 2017-04-02 | Stop reason: HOSPADM

## 2017-04-02 RX ORDER — FENTANYL CITRATE 50 UG/ML
INJECTION, SOLUTION INTRAMUSCULAR; INTRAVENOUS AS NEEDED
Status: DISCONTINUED | OUTPATIENT
Start: 2017-04-02 | End: 2017-04-02 | Stop reason: HOSPADM

## 2017-04-02 RX ORDER — CARBOPROST TROMETHAMINE 250 UG/ML
250 INJECTION, SOLUTION INTRAMUSCULAR ONCE
Status: COMPLETED | OUTPATIENT
Start: 2017-04-02 | End: 2017-04-02

## 2017-04-02 RX ORDER — ACETAMINOPHEN 10 MG/ML
1000 INJECTION, SOLUTION INTRAVENOUS
Status: COMPLETED | OUTPATIENT
Start: 2017-04-02 | End: 2017-04-02

## 2017-04-02 RX ORDER — ONDANSETRON 2 MG/ML
4 INJECTION INTRAMUSCULAR; INTRAVENOUS
Status: DISCONTINUED | OUTPATIENT
Start: 2017-04-02 | End: 2017-04-04 | Stop reason: HOSPADM

## 2017-04-02 RX ORDER — OXYCODONE HYDROCHLORIDE 5 MG/1
5 TABLET ORAL
Status: DISCONTINUED | OUTPATIENT
Start: 2017-04-02 | End: 2017-04-04 | Stop reason: HOSPADM

## 2017-04-02 RX ORDER — IBUPROFEN 800 MG/1
800 TABLET ORAL ONCE
Status: COMPLETED | OUTPATIENT
Start: 2017-04-02 | End: 2017-04-02

## 2017-04-02 RX ORDER — LOPERAMIDE HYDROCHLORIDE 2 MG/1
4 CAPSULE ORAL
Status: DISCONTINUED | OUTPATIENT
Start: 2017-04-02 | End: 2017-04-02

## 2017-04-02 RX ORDER — DIPHENHYDRAMINE HCL 25 MG
25 CAPSULE ORAL
Status: DISCONTINUED | OUTPATIENT
Start: 2017-04-02 | End: 2017-04-04 | Stop reason: HOSPADM

## 2017-04-02 RX ORDER — FENTANYL CITRATE 50 UG/ML
INJECTION, SOLUTION INTRAMUSCULAR; INTRAVENOUS
Status: DISCONTINUED
Start: 2017-04-02 | End: 2017-04-02

## 2017-04-02 RX ORDER — GENTAMICIN SULFATE 80 MG/100ML
80 INJECTION, SOLUTION INTRAVENOUS
Status: DISCONTINUED | OUTPATIENT
Start: 2017-04-02 | End: 2017-04-02

## 2017-04-02 RX ORDER — OXYTOCIN 10 [USP'U]/ML
INJECTION, SOLUTION INTRAMUSCULAR; INTRAVENOUS
Status: DISCONTINUED
Start: 2017-04-02 | End: 2017-04-02

## 2017-04-02 RX ORDER — IBUPROFEN 600 MG/1
600 TABLET ORAL
Status: ACTIVE | OUTPATIENT
Start: 2017-04-02 | End: 2017-04-03

## 2017-04-02 RX ORDER — GENTAMICIN SULFATE 80 MG/100ML
80 INJECTION, SOLUTION INTRAVENOUS
Status: COMPLETED | OUTPATIENT
Start: 2017-04-02 | End: 2017-04-02

## 2017-04-02 RX ORDER — HYDROMORPHONE HYDROCHLORIDE 1 MG/ML
INJECTION, SOLUTION INTRAMUSCULAR; INTRAVENOUS; SUBCUTANEOUS
Status: DISCONTINUED
Start: 2017-04-02 | End: 2017-04-02

## 2017-04-02 RX ORDER — OXYTOCIN/RINGER'S LACTATE 30/500 ML
PLASTIC BAG, INJECTION (ML) INTRAVENOUS
Status: DISCONTINUED
Start: 2017-04-02 | End: 2017-04-02

## 2017-04-02 RX ORDER — METHYLERGONOVINE MALEATE 0.2 MG/1
200 TABLET ORAL EVERY 6 HOURS
Status: COMPLETED | OUTPATIENT
Start: 2017-04-02 | End: 2017-04-03

## 2017-04-02 RX ORDER — METHYLERGONOVINE MALEATE 0.2 MG/ML
INJECTION INTRAVENOUS
Status: DISCONTINUED
Start: 2017-04-02 | End: 2017-04-02

## 2017-04-02 RX ORDER — OXYCODONE AND ACETAMINOPHEN 5; 325 MG/1; MG/1
1 TABLET ORAL
Status: DISCONTINUED | OUTPATIENT
Start: 2017-04-02 | End: 2017-04-02

## 2017-04-02 RX ORDER — OXYCODONE HYDROCHLORIDE 5 MG/1
10 TABLET ORAL
Status: DISCONTINUED | OUTPATIENT
Start: 2017-04-02 | End: 2017-04-04 | Stop reason: HOSPADM

## 2017-04-02 RX ORDER — ROPIVACAINE HYDROCHLORIDE 5 MG/ML
INJECTION, SOLUTION EPIDURAL; INFILTRATION; PERINEURAL AS NEEDED
Status: DISCONTINUED | OUTPATIENT
Start: 2017-04-02 | End: 2017-04-02 | Stop reason: HOSPADM

## 2017-04-02 RX ORDER — MISOPROSTOL 200 UG/1
TABLET ORAL
Status: DISCONTINUED
Start: 2017-04-02 | End: 2017-04-02

## 2017-04-02 RX ORDER — MORPHINE SULFATE 10 MG/ML
5 INJECTION, SOLUTION INTRAMUSCULAR; INTRAVENOUS ONCE
Status: DISCONTINUED | OUTPATIENT
Start: 2017-04-02 | End: 2017-04-02

## 2017-04-02 RX ORDER — OXYCODONE AND ACETAMINOPHEN 5; 325 MG/1; MG/1
2 TABLET ORAL ONCE
Status: COMPLETED | OUTPATIENT
Start: 2017-04-02 | End: 2017-04-02

## 2017-04-02 RX ORDER — HYDROMORPHONE HYDROCHLORIDE 1 MG/ML
1 INJECTION, SOLUTION INTRAMUSCULAR; INTRAVENOUS; SUBCUTANEOUS ONCE
Status: COMPLETED | OUTPATIENT
Start: 2017-04-02 | End: 2017-04-02

## 2017-04-02 RX ORDER — MORPHINE SULFATE 2 MG/ML
INJECTION, SOLUTION INTRAMUSCULAR; INTRAVENOUS
Status: COMPLETED
Start: 2017-04-02 | End: 2017-04-02

## 2017-04-02 RX ORDER — DOCUSATE SODIUM 100 MG/1
100 CAPSULE, LIQUID FILLED ORAL 2 TIMES DAILY
Status: DISCONTINUED | OUTPATIENT
Start: 2017-04-02 | End: 2017-04-04 | Stop reason: HOSPADM

## 2017-04-02 RX ADMIN — PENICILLIN G POTASSIUM 2.5 MILLION UNITS: 20000000 POWDER, FOR SOLUTION INTRAVENOUS at 16:15

## 2017-04-02 RX ADMIN — PENICILLIN G POTASSIUM 2.5 MILLION UNITS: 20000000 POWDER, FOR SOLUTION INTRAVENOUS at 20:05

## 2017-04-02 RX ADMIN — CARBOPROST TROMETHAMINE 250 MCG: 250 INJECTION, SOLUTION INTRAMUSCULAR at 12:36

## 2017-04-02 RX ADMIN — PENICILLIN G POTASSIUM 2.5 MILLION UNITS: 20000000 POWDER, FOR SOLUTION INTRAVENOUS at 07:01

## 2017-04-02 RX ADMIN — DIPHENHYDRAMINE HYDROCHLORIDE 25 MG: 25 CAPSULE ORAL at 23:50

## 2017-04-02 RX ADMIN — Medication 2 MG: at 11:45

## 2017-04-02 RX ADMIN — BENZOCAINE AND MENTHOL 1 SPRAY: 20; .5 SPRAY TOPICAL at 14:43

## 2017-04-02 RX ADMIN — METHYLERGONOVINE MALEATE: 0.2 INJECTION INTRAMUSCULAR; INTRAVENOUS at 11:47

## 2017-04-02 RX ADMIN — OXYTOCIN 18 MILLI-UNITS/MIN: 10 INJECTION, SOLUTION INTRAMUSCULAR; INTRAVENOUS at 06:30

## 2017-04-02 RX ADMIN — PENICILLIN G POTASSIUM 2.5 MILLION UNITS: 20000000 POWDER, FOR SOLUTION INTRAVENOUS at 11:00

## 2017-04-02 RX ADMIN — FENTANYL CITRATE 100 MCG: 50 INJECTION, SOLUTION INTRAMUSCULAR; INTRAVENOUS at 08:42

## 2017-04-02 RX ADMIN — WITCH HAZEL 1 PAD: 500 SOLUTION RECTAL; TOPICAL at 14:43

## 2017-04-02 RX ADMIN — MINERAL OIL 120 ML: 471.95 OIL ORAL at 12:34

## 2017-04-02 RX ADMIN — FENTANYL CITRATE 100 MCG: 50 INJECTION, SOLUTION INTRAMUSCULAR; INTRAVENOUS at 10:45

## 2017-04-02 RX ADMIN — ROPIVACAINE HYDROCHLORIDE 7 ML: 5 INJECTION, SOLUTION EPIDURAL; INFILTRATION; PERINEURAL at 00:37

## 2017-04-02 RX ADMIN — OXYCODONE HYDROCHLORIDE AND ACETAMINOPHEN 2 TABLET: 5; 325 TABLET ORAL at 14:42

## 2017-04-02 RX ADMIN — PENICILLIN G POTASSIUM 2.5 MILLION UNITS: 20000000 POWDER, FOR SOLUTION INTRAVENOUS at 03:11

## 2017-04-02 RX ADMIN — SODIUM CHLORIDE, SODIUM LACTATE, POTASSIUM CHLORIDE, AND CALCIUM CHLORIDE 500 ML: 600; 310; 30; 20 INJECTION, SOLUTION INTRAVENOUS at 06:05

## 2017-04-02 RX ADMIN — HYDROMORPHONE HYDROCHLORIDE 1 MG: 1 INJECTION, SOLUTION INTRAMUSCULAR; INTRAVENOUS; SUBCUTANEOUS at 12:36

## 2017-04-02 RX ADMIN — GENTAMICIN SULFATE 80 MG: 80 INJECTION, SOLUTION INTRAVENOUS at 10:58

## 2017-04-02 RX ADMIN — OXYTOCIN 16 MILLI-UNITS/MIN: 10 INJECTION, SOLUTION INTRAMUSCULAR; INTRAVENOUS at 03:45

## 2017-04-02 RX ADMIN — ROPIVACAINE HYDROCHLORIDE 3 ML: 5 INJECTION, SOLUTION EPIDURAL; INFILTRATION; PERINEURAL at 10:45

## 2017-04-02 RX ADMIN — METHYLERGONOVINE MALEATE 200 MCG: 0.2 TABLET ORAL at 18:02

## 2017-04-02 RX ADMIN — METHYLERGONOVINE MALEATE 200 MCG: 0.2 TABLET ORAL at 23:53

## 2017-04-02 RX ADMIN — ACETAMINOPHEN 1000 MG: 10 INJECTION, SOLUTION INTRAVENOUS at 10:58

## 2017-04-02 RX ADMIN — ROPIVACAINE HYDROCHLORIDE 5 ML: 5 INJECTION, SOLUTION EPIDURAL; INFILTRATION; PERINEURAL at 00:58

## 2017-04-02 RX ADMIN — ROPIVACAINE HYDROCHLORIDE 2 ML: 2 INJECTION, SOLUTION EPIDURAL; INFILTRATION; PERINEURAL at 08:42

## 2017-04-02 RX ADMIN — IBUPROFEN 800 MG: 800 TABLET ORAL at 14:43

## 2017-04-02 RX ADMIN — GENTAMICIN SULFATE 100 MG: 100 INJECTION, SOLUTION INTRAVENOUS at 18:02

## 2017-04-02 RX ADMIN — OXYCODONE HYDROCHLORIDE 10 MG: 5 TABLET ORAL at 18:26

## 2017-04-02 RX ADMIN — LOPERAMIDE HYDROCHLORIDE 4 MG: 2 CAPSULE ORAL at 14:42

## 2017-04-02 NOTE — PROGRESS NOTES
Labor Progress Note Continue Labor  Patient seen, fetal heart rate and contraction pattern evaluated, patient examined. Pt c/o increased pain w/ ctxs. Patient Vitals for the past 8 hrs:   BP Temp Pulse   17 0455 - 98.5 °F (36.9 °C) -   17 0446 113/58 - 90   17 0431 119/55 - 74   17 0417 114/57 - 76   17 0400 107/56 - 75   17 0345 110/57 - 93   17 0332 113/55 - 76   17 0318 109/54 - 91   17 0301 119/65 - 81   17 0245 115/62 - 89   17 0230 123/65 - 94   17 0216 116/65 - 91   17 0201 104/51 98.4 °F (36.9 °C) 81   17 0149 101/50 - 73   17 0134 100/55 - 86   17 0118 105/55 - 81   17 0103 113/64 - 83   17 0048 112/61 - 80   17 0043 114/61 - 81   17 0037 119/65 - 83   17 0033 115/62 - 80   17 0018 133/81 - 86   17 0011 119/75 - (!) 102   17 0006 120/67 - 86   17 0002 120/71 - 86   17 2357 130/73 - 87   17 2352 121/75 - 85   17 2347 127/73 - 86   17 2342 123/73 - 85   17 2336 124/72 - 85   17 2330 133/79 - 84   17 2315 114/59 - 82   17 2307 123/66 - 83   17 2252 121/60 98.3 °F (36.8 °C) 88   17 2236 102/54 - 72   17 2226 102/58 - 85   171 100/55 - 75   17 2157 105/54 - 78         FHTs:  150/mod/+accels/-decels    [place]: Q2-4min     SVE:  /0    Assessment/Plan:  21 y. o. at 39w2d for IOL due to GDM, LGA, poly:     1) IOL: IUPC placed; will attempt to achieve MVUs 250 and reassess in 2-3hrs. Discussed possibility of c/s if no significant improvement with adequate ctxs.   Pit 16  2) FHTs: Cat I, R   3) GDM: FS all <120; Q2hrs in AL   4) GBS +:  PCN

## 2017-04-02 NOTE — PROGRESS NOTES
SBAR IN Report: Mother    Verbal report received from Bryn Olszewski, RN on this patient, who is now being transferred from L & D for routine progression of care. The patient is not wearing a green \"Anesthesia-Duramorph\" band. Report consisted of patient's Situation, Background, Assessment and Recommendations (SBAR). [place] ID bands were compared with the identification form, and verified with the patient and transferring nurse. Information from the SBAR, Kardex, Procedure Summary, Intake/Output, MAR, Accordion, Recent Results and Med Rec Status and the [place] Report was reviewed with the transferring nurse; opportunity for questions and clarification provided.

## 2017-04-02 NOTE — PROGRESS NOTES
Late entry- pt seen at 1030am  Dr Ar Florez has asked for assistance with this patient while she deals with another emergency. Pt evaluated due to concerns on fetal EFM. FHR 170s with minimal variability- excellent scalp stim with >15x15 accel  Regular contractions    T 99.7, P 80s  SVE complete/0 with caput at +1  Pushes caput to +2    A/ G1 at 39.2 admitted for IOL due to GDM-A2 now complete with concerning fetal status    Plan/   1. Fetal status overall is reassuring with + scalp stim, will start IV tylenol for likely intra-amniotic infection and amp/gent, pt has received multiple fluid boluses  2. Start pushing as tolerated, discussed method of pushing, goals and pain control with patient and family.       Alysa Giles MD

## 2017-04-02 NOTE — PROGRESS NOTES
Postpartum orders clarified with Dr Farhat Vidal. Dr Farhat Vidal aware of the patients vitals after getting up for the first time. Continue with current orders and plan of care.

## 2017-04-02 NOTE — L&D DELIVERY NOTE
Delivery Summary    Patient: Juan Schaefer MRN: [id number]  SSN: [id number]    YOB: 1995  Age: 24 y.o. Sex: female       Information for the patient's :  Shahla Burkett [[id number]]       Labor Events:    Labor: No   Rupture Date: 2017   Rupture Time: 4:50 PM   Rupture Type AROM   Amniotic Fluid Volume: Polyhydramic    Amniotic Fluid Description: Clear     Induction: AROM; Oxytocin; Spence Bulb (balloon)       Augmentation: Oxytocin;AROM   Labor Events: Chorioamnionitis     Cervical Ripening: 3/31/2017 8:30 PM Cervidil     Delivery Events:  Episiotomy: Right Mediolateral   Laceration(s): Left periurethral     Repaired: Yes    Number of Repair Packets: 2   Suture Type and Size: Vicryl 3-0     Estimated Blood Loss (ml): 600ml       Delivery Date: 2017    Delivery Time: 11:41 AM  Delivery Type: Vaginal, Vacuum (Extractor)  Sex:  Male     Gestational Age: 44w2d   Delivery Clinician:  Martell Cantu  Living Status: Yes   Delivery Location: L&D            APGARS  One minute Five minutes Ten minutes   Skin color:            Heart rate:            Grimace:            Muscle tone:            Breathing: Totals:   7 8          Presentation: Vertex    Position:   Occiput Anterior  Resuscitation Method:        Meconium Stained:        Cord Vessels: 3 Vessels      Cord Events:    Cord Blood Sent?:  Yes    Blood Gases Sent?:  Yes    Placenta:  Date/Time:    Removal: Expressed      Appearance: Normal      Measurements:  Birth Weight:        Birth Length:        Head Circumference:        Chest Circumference:       Abdominal Girth:       Other Providers:    , Obstetrician;Primary Nurse;Primary  Nurse;Nicu Nurse;Neonatologist;Anesthesiologist;Crna;Nurse Practitioner;Midwife;Nursery Nurse           Group B Strep:   Lab Results   Component Value Date/Time    GrBStrep, External positive  2016    GrBStrep, External POSITIVE  2016     Information for the patient's :  Juan J Plunkett [[id number]]     Lab Results   Component Value Date/Time    ABO/Rh(D) A POSITIVE 2017 11:41 AM    MERVAT IgG NEG 2017 11:41 AM     Lab Results   Component Value Date/Time    APH 7.158 (L) 2017 11:41 AM    APCO2 75 (H) 2017 11:41 AM    APO2 13 2017 11:41 AM    AHCO3 26 2017 11:41 AM    ABDC 4.6 (H) 2017 11:41 AM    EPHV 7.343 2017 11:41 AM    PCO2V 39 2017 11:41 AM    PO2V 32 2017 11:41 AM    HCO3V 21 2017 11:41 AM    EBDV 4.4 2017 11:41 AM    SITE CORD 2017 11:41 AM    SITE CORD 2017 11:41 AM    RSCOM bakari, nnp at 2017 11 56 31 AM. Read back. 2017 11:41 AM    RSCOM na at 2017 11 56 56 AM. Not read back. 2017 11:41 AM    .    Asked Dr. Alberto Koroma to supervise pt for a moment while taking care of another emergency in main OR. Dr. Dominguez Salmon called to inform me of pt's continued tachycardia w/ minimal variability but good scalp stimulation. Concern for likely Chorio, although mom had not yet had a temp >100.4. Started Kaushik Pay in addition to the PCN she was already receiving in addition to Tylenol. Decision made to proceed with pushing with the pt. Upon presentation back upstairs, variability had improved with pushing and baby +3 station. Baby Rn's present for delivery. Pt pushing well but not progressing much further and developing maternal exhaustion with a continued Cat II strip w/ Chorio. Kiwi vacuum applied x 1 w/ 1 pull and pop-off. Attempt at Ritgen procedure with maternal pushes but unsuccessful. Right mediolateral episiotomy made with immediate delivery of fetal head OA, followed easily thereafter by the rest of the body. VAVD of a VMI at 1141 on 17, Apgars 7, 8. Cord cut/clamped, baby to mom. Cord gases were drawn. Placenta delivered S/I/3VC.    L periurethral lac hemostatic w/ out repair; other wise no lacerations other than the midline episiotomy--repaired in typical fashion. Uterus noted to be floppy. Methergine and Hemabate given x1 each w/ fundal massage and Morphine/Dilaudid for pain. FF w/ pit and massage. . Mom/baby stable. Will continue Methergine series pp.         Gerald Duran MD

## 2017-04-02 NOTE — PROGRESS NOTES
SBAR report received from [facility]. Assumed care of patient.

## 2017-04-02 NOTE — PROGRESS NOTES
CRNA called to room for patient complaint of 5/10 abdominal pain with contractions unrelieved by epidural PCA.

## 2017-04-02 NOTE — LACTATION NOTE
Assisted mom with pumping. Reviewed with her how to use the pump and pump kit. Started mom pumping and after 10 minutes she wanted to stop. Said she would do more next time. She did get several drops from her right breast. I took it down to nursery and dad was in the room so he fed it to infant with his finger. Instructed mom to pump every 2-3 hours. Mom may need assistance at next pumping for review.  Lactation consultant will follow up in am.

## 2017-04-02 NOTE — PROGRESS NOTES
SBAR OUT Report: Mother    Verbal report given to A. Kemp Litten RN (full name & credentials) on this patient, who is now being transferred to MIU (unit) for routine progression of care. The patient is not wearing a green \"Anesthesia-Duramorph\" band. Report consisted of patient's Situation, Background, Assessment and Recommendations (SBAR).  ID bands were compared with the identification form, and verified with the patient and receiving nurse. Information from the SBAR, Kardex, Procedure Summary, Intake/Output, MAR, Accordion and Recent Results and the [place] Report was reviewed with the receiving nurse; opportunity for questions and clarification provided.

## 2017-04-02 NOTE — PROGRESS NOTES
Dr Trang Moran reviewed last 30 min of tracing. Orders received for blood sugars every 6 hours until pt 6 cm dilated.

## 2017-04-02 NOTE — LACTATION NOTE
In to see patient for first time. Offered to start mom pumping since infant is in [facility]. [email or web address] stated that she does not want to at this time because she is in too much pain. She stated also that she does not want to put infant to breast that she will pump only.

## 2017-04-02 NOTE — PROGRESS NOTES
Labor Progress Note Continue Labor  Patient seen, fetal heart rate and contraction pattern evaluated, patient examined. Pt more comfortable s/p Fentanyl. Patient Vitals for the past 8 hrs:   BP Temp Pulse   17 0849 117/65 - 82   17 0843 114/64 - 85   17 0832 113/67 - 86   17 0818 119/69 - 78   17 0803 116/67 - 82   17 0748 109/75 - 85   17 0719 98/54 - 70   17 0704 97/51 - 76   17 0700 - (P) 99.2 °F (37.3 °C) -   17 0650 100/51 - 68   17 0631 107/56 - 69   17 0625 105/55 - 78   17 0620 102/50 - 82   17 0616 105/56 - 83   17 0610 108/55 - 86   17 0606 111/63 - 92   17 0602 113/60 - 93   17 0555 113/58 - 86   17 0550 113/61 - 90   17 0547 116/62 - 88   17 0540 115/67 - 85   17 0531 126/78 - 88   17 0515 130/78 - 89   17 0500 123/77 - 88   17 0455 - 98.5 °F (36.9 °C) -   17 0446 113/58 - 90   17 0431 119/55 - 74   17 0417 114/57 - 76   17 0400 107/56 - 75   17 0345 110/57 - 93   17 0332 113/55 - 76   17 0318 109/54 - 91   17 0301 119/65 - 81   17 0245 115/62 - 89   17 0230 123/65 - 94   17 0216 116/65 - 91   17 0201 104/51 98.4 °F (36.9 °C) 81   17 0149 101/50 - 73   17 0134 100/55 - 86   17 0118 105/55 - 81         FHTs:  160/min-mod/+accels/+ rare late decel   [place]: Q2-3, MVUs >200    SVE:  9/c/0    Assessment/Plan:  21 y. o. at 39w2d w/ IOL due to GDM, LGA, Poly:     1) IOL:  Progressing well now w/ adequate ctxs; pit at 30. Expect delivery soon. 2) FHTs:  Cat II--periods of minimal variability--likely secondary to Fentanyl, great scalp stim --overall reassuring. Now w/ fetal tachycardia; mom remains afebrile and HR wnl--no dx of Chorio yet; watching closely.   Discussed w/ pt even if develops Chorio, that would not be an indication for c/s   3) GBS +:  PCN  4) GDM/LGA:  fs all <120; discussed w/ pt, again, possibility of shoulder dystocia w/ larger baby that may require extra maneuvers to deliver. Last growth <4500g--no indication for c/s.   Pt voices understanding

## 2017-04-02 NOTE — PROGRESS NOTES
[address] CRNA here and has given 2 of fentanyl and 2 of lidocaine. Also increased pump at this time.

## 2017-04-02 NOTE — PROGRESS NOTES
Pt having pain on her left side. Pt placed to left lateral and placed on peanut.  here to assess pt and discussed anesthesia. Plan of care reviewed with pt and . Both verbalized understanding. Dr. Purdy So will have CRNA give a different medication in epidural at this time.

## 2017-04-03 PROCEDURE — 74011250637 HC RX REV CODE- 250/637: Performed by: OBSTETRICS & GYNECOLOGY

## 2017-04-03 PROCEDURE — 65270000029 HC RM PRIVATE

## 2017-04-03 PROCEDURE — 74011250636 HC RX REV CODE- 250/636: Performed by: OBSTETRICS & GYNECOLOGY

## 2017-04-03 RX ADMIN — METHYLERGONOVINE MALEATE 200 MCG: 0.2 TABLET ORAL at 05:58

## 2017-04-03 RX ADMIN — METHYLERGONOVINE MALEATE 200 MCG: 0.2 TABLET ORAL at 23:51

## 2017-04-03 RX ADMIN — OXYCODONE HYDROCHLORIDE 10 MG: 5 TABLET ORAL at 08:56

## 2017-04-03 RX ADMIN — METHYLERGONOVINE MALEATE 200 MCG: 0.2 TABLET ORAL at 18:32

## 2017-04-03 RX ADMIN — OXYCODONE HYDROCHLORIDE 10 MG: 5 TABLET ORAL at 04:49

## 2017-04-03 RX ADMIN — DOCUSATE SODIUM 100 MG: 100 CAPSULE, LIQUID FILLED ORAL at 08:56

## 2017-04-03 RX ADMIN — METHYLERGONOVINE MALEATE 200 MCG: 0.2 TABLET ORAL at 12:30

## 2017-04-03 RX ADMIN — OXYCODONE HYDROCHLORIDE 10 MG: 5 TABLET ORAL at 12:34

## 2017-04-03 RX ADMIN — OXYCODONE HYDROCHLORIDE 10 MG: 5 TABLET ORAL at 18:32

## 2017-04-03 RX ADMIN — DOCUSATE SODIUM 100 MG: 100 CAPSULE, LIQUID FILLED ORAL at 18:32

## 2017-04-03 RX ADMIN — OXYCODONE HYDROCHLORIDE 10 MG: 5 TABLET ORAL at 22:29

## 2017-04-03 RX ADMIN — GENTAMICIN SULFATE 100 MG: 100 INJECTION, SOLUTION INTRAVENOUS at 02:02

## 2017-04-03 RX ADMIN — PENICILLIN G POTASSIUM 2.5 MILLION UNITS: 20000000 POWDER, FOR SOLUTION INTRAVENOUS at 00:00

## 2017-04-03 NOTE — LACTATION NOTE

## 2017-04-03 NOTE — PROGRESS NOTES
Progress Note                               Patient: Brigette Dickey MRN: [id number]  SSN: [id number]    YOB: 1995  Age: 24 y.o. Sex: female      Postpartum Day Number 1    Subjective:     Patient doing well postpartum without significant complaints. Voiding without difficulty. Patient reports normal lochia. . Breastfeeding: Yes     Objective:     Patient Vitals for the past 18 hrs:   Temp Pulse Resp BP   17 0737 98.7 °F (37.1 °C) (!) 105 18 125/61   17 0450 98.4 °F (36.9 °C) - - -   17 0339 98.7 °F (37.1 °C) - - -   17 1950 97.6 °F (36.4 °C) 86 17 108/63        Temp (24hrs), Av °F (36.7 °C), Min:97.6 °F (36.4 °C), Max:98.7 °F (37.1 °C)      Physical Exam:    General:   Patient without distress. Abdomen: Soft, fundus firm at level of umbilicus, nontender   Lower Extremities: Negative for swelling, cords, or tenderness. Lab/Data Review: All lab results for the last 24 hours reviewed. Assessment and Plan:     Patient appears to be having an uncomplicated postpartum course. Continue routine perineal care and maternal education.     Signed By: Emiliano Chun MD     April 3, 2017

## 2017-04-03 NOTE — ANESTHESIA POSTPROCEDURE EVALUATION
Post-Anesthesia Evaluation and Assessment    Patient: Darshana Romero MRN: [id number]  SSN: [id number]    YOB: 1995  Age: 24 y.o. Sex: female       Cardiovascular Function/Vital Signs  Visit Vitals    /63 (BP 1 Location: Right arm, BP Patient Position: At rest)    Pulse 86    Temp 36.9 °C (98.4 °F)    Resp 17    Ht 5' 4\" (1.626 m)    Wt 83.9 kg (185 lb)    SpO2 98%    Breastfeeding Unknown    BMI 31.76 kg/m2       Patient is status post epidural anesthesia for * No procedures listed *. Nausea/Vomiting: None    Postoperative hydration reviewed and adequate. Pain:  Pain Scale 1: Numeric (0 - 10) (04/03/17 0550)  Pain Intensity 1: 3 (04/03/17 0550)   Managed    Neurological Status:   Neuro (WDL): Exceptions to WDL (04/02/17 1145)  Neuro  LLE Motor Response: Pharmacologically paralyzed (04/02/17 1145)  RLE Motor Response: Pharmacologically paralyzed (04/02/17 1145)   At baseline    Mental Status and Level of Consciousness: Arousable    Pulmonary Status:   O2 Device: Room air (04/02/17 2100)   Adequate oxygenation and airway patent    Complications related to anesthesia: None    Post-anesthesia assessment completed. No concerns. The patient was satisfied with her labor epidural and denies any complications. Her lower extremities have returned to baseline neurologically.       Signed By: Rosalia Gibson MD     April 3, 2017

## 2017-04-03 NOTE — PROGRESS NOTES
Patient refuses IV. [address] [facility] notified. States to  patient on fact that if she spikes a temperature again she would have \"to do something\".

## 2017-04-03 NOTE — PROGRESS NOTES
Report received from [address] [place] [address], [facility]3].

## 2017-04-03 NOTE — PROGRESS NOTES
provided education and pamphlet on [facility] Postpartum [place] Home Visit Program.  Family was undecided on need for home visit. No referral will be made at this time.   Family has this 's contact information should they decide to participate in program.      Christopher Roberto, [address] [facility]

## 2017-04-03 NOTE — PROGRESS NOTES
Patient c/o swelling in hands and throat. Denies having trouble breathing or swallowing. [facility] notified. Said to give Benadryl and continue with medications as ordered. States to call her if symptoms worsen.

## 2017-04-03 NOTE — LACTATION NOTE
This note was copied from a baby's chart. In to see mom and infant for follow up. Mom continues to desire to pump and provide breast milk and also supplement after per choice. She states she does not have a desire to try at breast. Reviewed supply and demand in building milk supply- encouraged to pump at least 8 times per day. Reviewed normal pumping volumes for first week of life and ways to maximize output. Mom go coconut oil as gift today and reviewed she can use some to lubricate pump flanges when pumping if desired. She may be interested in hospital grade pump rental tomorrow, also checking with friend as well. Dad notified they may qualify for personal pump through insurance- dad to call insurance today and check. All questions answered, no further needs. Infant out of room being circumcised during visit with parents.

## 2017-04-04 VITALS
HEART RATE: 80 BPM | HEIGHT: 64 IN | WEIGHT: 185 LBS | BODY MASS INDEX: 31.58 KG/M2 | SYSTOLIC BLOOD PRESSURE: 129 MMHG | RESPIRATION RATE: 18 BRPM | DIASTOLIC BLOOD PRESSURE: 71 MMHG | TEMPERATURE: 97.9 F | OXYGEN SATURATION: 98 %

## 2017-04-04 PROCEDURE — 74011250637 HC RX REV CODE- 250/637: Performed by: OBSTETRICS & GYNECOLOGY

## 2017-04-04 RX ORDER — OXYCODONE HYDROCHLORIDE 5 MG/1
5 TABLET ORAL
Qty: 15 TAB | Refills: 0 | Status: SHIPPED | OUTPATIENT
Start: 2017-04-04 | End: 2017-05-19 | Stop reason: ALTCHOICE

## 2017-04-04 RX ADMIN — DOCUSATE SODIUM 100 MG: 100 CAPSULE, LIQUID FILLED ORAL at 09:15

## 2017-04-04 RX ADMIN — OXYCODONE HYDROCHLORIDE 10 MG: 5 TABLET ORAL at 02:32

## 2017-04-04 RX ADMIN — OXYCODONE HYDROCHLORIDE 10 MG: 5 TABLET ORAL at 06:26

## 2017-04-04 NOTE — PROGRESS NOTES
Social work follow-up with patient to see if she would like a [facility]  Home Visit. Patient/FOB declined as they will be living with patient's parents who they state are \"private people. \"    Patient reports a strong support system of family and confirm that she will have direct assistance from her parents for at least three months. Family denied additional needs at this time.     Jones Gil, [address] [facility]

## 2017-04-04 NOTE — PROGRESS NOTES
Patient given Oxycodone PO for patient reported pain 4/10 in abdomen. See MAR. Call light within reach, bed wheels locked, bed in low position, and side rails up x 2. Patient encouraged to call for assistance. Family at bedside.

## 2017-04-04 NOTE — LACTATION NOTE
Mom and baby are going home today. Pump and bottle. Mom's milk should be fully in over the next few days. Reviewed engorgement precautions. Hand Expression has been demoed and written hand-out reviewed. As milk comes in baby will be more alert at the breast and swallows will be more obvious. Breasts may feel softer once baby has finished nursing. Baby should be back to birth weight by 3weeks of age. And then gain on average 1 oz per day for the next 2-3 months. Reviewed babies should be exclusively breastfeeding for the first 6 months and that breastfeeding should continue after introduction of appropriate complimentary foods after 6 months. Initial output should be at least 1 wet and 1 bowel movement for each day old baby is. By day 5-7 once milk is fully in baby will consistently have 6 or more soaking wet diapers and about 4 bowel movement. Some babies have a bowel movement with every feeding and some have 1-3 large bowel movements each day. Inadequate output may indicate inadequate feedings and should be reported to your Pediatrician. Bowel habits may change as baby gets older. Encouraged follow-up at Pediatrician in 1-2 days, no later than 1 week of age. Call [facility] for any questions as needed or to set up an OP visit. OP phone calls are returned within 24 hours. Breastfeeding Support Group is offered here monthly. Community Breastfeeding Resource List given.

## 2017-04-04 NOTE — PROGRESS NOTES
Patient given Oxycodone PO for patient reported pain 6/10 in abdomen. See MAR. Call light within reach, bed wheels locked, bed in low position, and side rails up x 2. Patient encouraged to call for assistance. Family at bedside.

## 2017-04-04 NOTE — DISCHARGE INSTRUCTIONS
Vaginal Childbirth: Care Instructions  Your Care Instructions  Your body will slowly heal in the next few weeks. It is easy to get too tired and overwhelmed during the first weeks after your baby is born. Changes in your hormones can shift your mood without warning. You may find it hard to meet the extra demands on your energy and time. Take it easy on yourself. Follow-up care is a key part of your treatment and safety. Be sure to make and go to all appointments, and call your doctor if you are having problems. It's also a good idea to know your test results and keep a list of the medicines you take. How can you care for yourself at home? · Vaginal bleeding and cramps  ¨ After delivery, you will have a bloody discharge from the vagina. This will turn pink within a week and then white or yellow after about 10 days. It may last for 2 to 4 weeks or longer, until the uterus has healed. Use pads instead of tampons until you stop bleeding. ¨ Do not worry if you pass some blood clots, as long as they are smaller than a golf ball. If you have a tear or stitches in your vaginal area, change the pad at least every 4 hours to prevent soreness and infection. ¨ You may have cramps for the first few days after childbirth. These are normal and occur as the uterus shrinks to normal size. Take an over-the-counter pain medicine, such as acetaminophen (Tylenol), ibuprofen (Advil, Motrin), or naproxen (Aleve), for cramps. Read and follow all instructions on the label. Do not take aspirin, because it can cause more bleeding. ¨ Do not take two or more pain medicines at the same time unless the doctor told you to. Many pain medicines have acetaminophen, which is Tylenol. Too much acetaminophen (Tylenol) can be harmful. · Stitches  ¨ If you have stitches, they will dissolve on their own and do not need to be removed. Follow your doctor's instructions for cleaning the stitched area.   ¨ Put ice or a cold pack on your painful area for 10 to 20 minutes at a time, several times a day, for the first few days. Put a thin cloth between the ice and your skin. ¨ Sit in a few inches of warm water (sitz bath) 3 times a day and after bowel movements. The warm water helps with pain and itching. If you do not have a tub, a warm shower might help. · Breast fullness  ¨ Your breasts may overfill (engorge) in the first few days after delivery. To help milk flow and to relieve pain, warm your breasts in the shower or by using warm, moist towels before nursing. ¨ If you are not nursing, do not put warmth on your breasts or touch your breasts. Wear a tight bra or sports bra and use ice until the fullness goes away. This usually takes 2 to 3 days. ¨ Put ice or a cold pack on your breast after nursing to reduce swelling and pain. Put a thin cloth between the ice and your skin. · Activity  ¨ Eat a balanced diet. Do not try to lose weight by cutting calories. Keep taking your prenatal vitamins, or take a multivitamin. ¨ Get as much rest as you can. Try to take naps when your baby sleeps during the day. ¨ Get some exercise every day. But do not do any heavy exercise until your doctor says it is okay. ¨ Wait until you are healed (about 4 to 6 weeks) before you have sexual intercourse. Your doctor will tell you when it is okay to have sex. ¨ Talk to your doctor about birth control. You can get pregnant even before your period returns. Also, you can get pregnant while you are breastfeeding. · Mental health  ¨ It is normal to have some sadness, anxiety, sleeplessness, and mood swings after you go home. If you feel upset or hopeless for more than a few days or are having trouble doing the things you need to do, talk to your doctor. · Constipation and hemorrhoids  ¨ Drink plenty of fluids, enough so that your urine is light yellow or clear like water.  If you have kidney, heart, or liver disease and have to limit fluids, talk with your doctor before you increase the amount of fluids you drink. ¨ Eat plenty of fiber each day. Have a bran muffin or bran cereal for breakfast, and try eating a piece of fruit for a mid-afternoon snack. ¨ For painful, itchy hemorrhoids, put ice or a cold pack on the area several times a day for 10 minutes at a time. Follow this by putting a warm compress on the area for another 10 to 20 minutes or by sitting in a shallow, warm bath. When should you call for help? Call 911 anytime you think you may need emergency care. For example, call if:  · You are thinking of hurting yourself, your baby, or anyone else. · You have sudden, severe pain in your belly. · You passed out (lost consciousness). Call your doctor now or seek immediate medical care if:  · You have severe vaginal bleeding. · You are soaking through a pad each hour for 2 or more hours. · Your vaginal bleeding seems to be getting heavier or is still bright red 4 days after delivery. · You are dizzy or lightheaded, or you feel like you may faint. · You are vomiting or cannot keep fluids down. · You have a fever. · You have new or more belly pain. · You pass tissue (not just blood). · Your vaginal discharge smells bad. · Your belly feels tender or full and hard. · Your breasts are continuously painful or red. · You feel sad, anxious, or hopeless for more than a few days. Watch closely for changes in your health, and be sure to contact your doctor if you have any problems. Where can you learn more? Go to http://rebekah-nadja.info/. Enter F547 in the search box to learn more about \"Vaginal Childbirth: Care Instructions. \"  Current as of: May 30, 2016  Content Version: 11.2  © 2422-5166 [employer]. Care instructions adapted under license by [employer] (which disclaims liability or warranty for this information).  If you have questions about a medical condition or this instruction, always ask your healthcare professional. [employer] disclaims any warranty or liability for your use of this information. DISCHARGE SUMMARY from Nurse    The following personal items are in your possession at time of discharge:    Dental Appliances: None  Visual Aid: None     Home Medications: Sent home  Jewelry: None  Clothing: At bedside  Other Valuables: At bedside, Cell Phone  Personal Items Sent to Safe: none          PATIENT INSTRUCTIONS:    After general anesthesia or intravenous sedation, for 24 hours or while taking prescription Narcotics:  · Limit your activities  · Do not drive and operate hazardous machinery  · Do not make important personal or business decisions  · Do  not drink alcoholic beverages  · If you have not urinated within 8 hours after discharge, please contact your surgeon on call. Report the following to your surgeon:  · Excessive pain, swelling, redness or odor of or around the surgical area  · Temperature over 100.5  · Nausea and vomiting lasting longer than 4 hours or if unable to take medications  · Any signs of decreased circulation or nerve impairment to extremity: change in color, persistent  numbness, tingling, coldness or increase pain  · Any questions        What to do at Home:      *  Please give a list of your current medications to your Primary Care Provider. *  Please update this list whenever your medications are discontinued, doses are      changed, or new medications (including over-the-counter products) are added. *  Please carry medication information at all times in case of emergency situations. These are general instructions for a healthy lifestyle:    No smoking/ No tobacco products/ Avoid exposure to second hand smoke    Surgeon General's Warning:  Quitting smoking now greatly reduces serious risk to your health.     Obesity, smoking, and sedentary lifestyle greatly increases your risk for illness    A healthy diet, regular physical exercise & weight monitoring are important for maintaining a healthy lifestyle    You may be retaining fluid if you have a history of heart failure or if you experience any of the following symptoms:  Weight gain of 3 pounds or more overnight or 5 pounds in a week, increased swelling in our hands or feet or shortness of breath while lying flat in bed. Please call your doctor as soon as you notice any of these symptoms; do not wait until your next office visit. Recognize signs and symptoms of STROKE:    F-face looks uneven    A-arms unable to move or move unevenly    S-speech slurred or non-existent    T-time-call 911 as soon as signs and symptoms begin-DO NOT go       Back to bed or wait to see if you get better-TIME IS BRAIN. Warning Signs of HEART ATTACK     Call 911 if you have these symptoms:   Chest discomfort. Most heart attacks involve discomfort in the center of the chest that lasts more than a few minutes, or that goes away and comes back. It can feel like uncomfortable pressure, squeezing, fullness, or pain.  Discomfort in other areas of the upper body. Symptoms can include pain or discomfort in one or both arms, the back, neck, jaw, or stomach.  Shortness of breath with or without chest discomfort.  Other signs may include breaking out in a cold sweat, nausea, or lightheadedness. Don't wait more than five minutes to call 911 - MINUTES MATTER! Fast action can save your life. Calling 911 is almost always the fastest way to get lifesaving treatment. Emergency Medical Services staff can begin treatment when they arrive -- up to an hour sooner than if someone gets to the hospital by car. The discharge information has been reviewed with the patient. The patient verbalized understanding. Discharge medications reviewed with the patient and appropriate educational materials and side effects teaching were provided.

## 2017-04-04 NOTE — PROGRESS NOTES
Patient given Oxycodone PO for patient reported pain 5/10 in abdomen. See MAR. Call light within reach, bed wheels locked, bed in low position, and side rails up x 2. Patient encouraged to call for assistance. Family at bedside.

## 2017-04-04 NOTE — DISCHARGE SUMMARY
Pt to see pc fo bg 3m  Dc teaching done                             Post-Partum Day Number 2 Progress/Discharge Note    Patient doing well post-partum without significant complaint. Voiding without difficulty, normal lochia, positive flatus. Vitals:  Patient Vitals for the past 8 hrs:   BP Temp Pulse Resp   17 0746 129/71 97.9 °F (36.6 °C) 80 18     Temp (24hrs), Av.2 °F (36.8 °C), Min:97.9 °F (36.6 °C), Max:98.5 °F (36.9 °C)      Vital signs stable, afebrile. Exam:  Patient without distress. Abdomen soft, fundus firm at level of umbilicus, non tender               Perineum with normal lochia noted. Lower extremities are negative for swelling, cords or tenderness. Lab/Data Review: All lab results for the last 24 hours reviewed. Assessment and Plan:  Patient appears to be having uncomplicated post-partum course. Continue routine perineal care and maternal education. Plan discharge for today with follow up in our office in 6 weeks.

## 2017-04-04 NOTE — LACTATION NOTE
Mom and baby going home. Mom continues to pump and supplement per her choice. Retrieving drops-2 ml. Offered hospital pump rental but would like to try her personal pump at home first. Will call if rental needed. Encouraged to pump at least 8 times in 24 hours. Reviewed jaundice, weight expectations, and engorgement. Reviewed outpatient resources if needed. Mom denies questions.

## 2017-06-08 ENCOUNTER — HOSPITAL ENCOUNTER (EMERGENCY)
Age: 22
Discharge: HOME | End: 2017-06-08
Attending: EMERGENCY MEDICINE
Payer: COMMERCIAL

## 2017-06-08 VITALS
WEIGHT: 156 LBS | HEART RATE: 89 BPM | TEMPERATURE: 98.1 F | DIASTOLIC BLOOD PRESSURE: 75 MMHG | BODY MASS INDEX: 26.63 KG/M2 | OXYGEN SATURATION: 100 % | RESPIRATION RATE: 16 BRPM | HEIGHT: 64 IN | SYSTOLIC BLOOD PRESSURE: 139 MMHG

## 2017-06-08 DIAGNOSIS — L50.9 URTICARIA: Primary | ICD-10-CM

## 2017-06-08 PROCEDURE — 74011250637 HC RX REV CODE- 250/637: Performed by: EMERGENCY MEDICINE

## 2017-06-08 PROCEDURE — 99283 EMERGENCY DEPT VISIT LOW MDM: CPT | Performed by: EMERGENCY MEDICINE

## 2017-06-08 PROCEDURE — 74011636637 HC RX REV CODE- 636/637: Performed by: EMERGENCY MEDICINE

## 2017-06-08 RX ORDER — PREDNISONE 20 MG/1
40 TABLET ORAL DAILY
Qty: 8 TAB | Refills: 0 | Status: SHIPPED | OUTPATIENT
Start: 2017-06-08 | End: 2017-06-12

## 2017-06-08 RX ORDER — PREDNISONE 20 MG/1
40 TABLET ORAL DAILY
Qty: 8 TAB | Refills: 0 | Status: SHIPPED | OUTPATIENT
Start: 2017-06-08 | End: 2017-06-18

## 2017-06-08 RX ORDER — DIPHENHYDRAMINE HCL 25 MG
25 CAPSULE ORAL
Status: COMPLETED | OUTPATIENT
Start: 2017-06-08 | End: 2017-06-08

## 2017-06-08 RX ORDER — DIPHENHYDRAMINE HCL 25 MG
25 CAPSULE ORAL
Qty: 20 CAP | Refills: 0 | Status: SHIPPED | OUTPATIENT
Start: 2017-06-08 | End: 2017-06-18

## 2017-06-08 RX ORDER — PREDNISONE 20 MG/1
60 TABLET ORAL
Status: COMPLETED | OUTPATIENT
Start: 2017-06-08 | End: 2017-06-08

## 2017-06-08 RX ADMIN — PREDNISONE 60 MG: 20 TABLET ORAL at 19:10

## 2017-06-08 RX ADMIN — DIPHENHYDRAMINE HYDROCHLORIDE 25 MG: 25 CAPSULE ORAL at 19:09

## 2017-06-08 NOTE — DISCHARGE INSTRUCTIONS
Hives: Care Instructions  Your Care Instructions  Hives are raised, red, itchy patches of skin. They are also called wheals or welts. They usually have red borders and pale centers. Hives range in size from ¼ inch to 3 inches or more across. They may seem to move from place to place on the skin. Several hives may form a large area of raised, red skin. You can get hives after an insect sting, after taking medicine or eating certain foods, or because of infection or stress. Other causes include plants, things you breathe in, makeup, heat, cold, sunlight, and latex. You cannot spread hives to other people. Hives may last a few minutes or a few days, but a single spot may last less than 36 hours. Follow-up care is a key part of your treatment and safety. Be sure to make and go to all appointments, and call your doctor if you are having problems. It's also a good idea to know your test results and keep a list of the medicines you take. How can you care for yourself at home? · Avoid whatever you think may have caused your hives, such as a certain food or medicine. However, you may not know the cause. · Put a cool, wet towel on the area to relieve itching. · Take an over-the-counter antihistamine, such as diphenhydramine (Benadryl), cetirizine (Zyrtec), or loratadine (Claritin), to help stop the hives and calm the itching. Read and follow directions on the label. These medicines can make you feel sleepy. Do not drive while using them. · Stay away from strong soaps, detergents, and chemicals. These can make itching worse. When should you call for help? Call 911 anytime you think you may need emergency care. For example, call if:  · You have symptoms of a severe allergic reaction. These may include:  ¨ Sudden raised, red areas (hives) all over your body. ¨ Swelling of the throat, mouth, lips, or tongue. ¨ Trouble breathing. ¨ Passing out (losing consciousness).  Or you may feel very lightheaded or suddenly feel weak, confused, or restless. Call your doctor now or seek immediate medical care if:  · You have symptoms of an allergic reaction, such as:  ¨ A rash or hives (raised, red areas on the skin). ¨ Itching. ¨ Swelling. ¨ Belly pain, nausea, or vomiting. · You get hives after you start a new medicine. · Hives have not gone away after 24 hours. Watch closely for changes in your health, and be sure to contact your doctor if:  · You do not get better as expected. Where can you learn more? Go to http://rebekah-nadja.info/. Enter E819 in the search box to learn more about \"Hives: Care Instructions. \"  Current as of: May 27, 2016  Content Version: 11.2  © 9042-2975 [employer]. Care instructions adapted under license by [employer] (which disclaims liability or warranty for this information). If you have questions about a medical condition or this instruction, always ask your healthcare professional. [address] any warranty or liability for your use of this information.

## 2017-06-08 NOTE — ED TRIAGE NOTES
Pt reports she has had hives x 2 days. Pt reports she recently started taking a new antidepressant. Pt denies any shortness of breath.       Donald Luque RN

## 2017-06-08 NOTE — LETTER
[facility] EMERGENCY DEPT 
[address] [place] [id number] 
[phone] Work/School Note Date: 6/8/2017 To Whom It May concern: 
 
Court Lipoma was seen and treated today in the emergency room by the following provider(s): 
Attending Provider: Molly Valdes MD. Court Lipoma {Return to school/sport/work: 6/10/2017 Sincerely, Molly Valdes MD

## 2017-06-08 NOTE — ED PROVIDER NOTES
HPI Comments: 80-year-old female presents to the ER today complaining of a diffuse itchy rash which began after work yesterday. She denies any new foods or ingestions. 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  She contacted her primary care provider about the rash today and they advised her to stop the antidepressant which she recently started for fear that it was a reaction to that. She denies any wheezing or trouble breathing. She denies any throat swelling. She did take a dose of Benadryl last evening with minimal relief. She has not tried any medications for the hives today. Patient is a 24 y.o. female presenting with allergic reaction. The history is provided by the patient. Allergic Reaction    This is a new problem. The current episode started yesterday. The problem has been gradually worsening. Past Medical History:   Diagnosis Date    Fetal hydronephrosis in pregnancy, antepartum condition 11/23/2016    Gestational diabetes 3/31/2017    Iron deficiency anemia 1/30/2017    Scoliosis        History reviewed. No pertinent surgical history. Family History:   Problem Relation Age of Onset    Hypertension Father        Social History     Social History    Marital status: SINGLE     Spouse name: Noni Middleton Number of children: N/A    Years of education: N/A     Occupational History    Not on file. Social History Main Topics    Smoking status: Former Smoker     Packs/day: 1.50     Quit date: 8/7/2016    Smokeless tobacco: Never Used    Alcohol use No    Drug use: No    Sexual activity: Yes     Partners: Male     Birth control/ protection: None     Other Topics Concern    Not on file     Social History Narrative         ALLERGIES: Review of patient's allergies indicates no known allergies. Review of Systems   Constitutional: Negative. HENT: Negative. Eyes: Negative.     Respiratory: Negative. Cardiovascular: Negative. Gastrointestinal: Negative. Endocrine: Negative. Genitourinary: Negative. Musculoskeletal: Negative. Skin: Positive for rash. Allergic/Immunologic: Positive for environmental allergies. Negative for food allergies and immunocompromised state. Neurological: Negative. Vitals:    06/08/17 1856   BP: 139/75   Pulse: 89   Resp: 16   Temp: 98.1 °F (36.7 °C)   SpO2: 100%   Weight: 70.8 kg (156 lb)   Height: 5' 4\" (1.626 m)            Physical Exam   Constitutional: She is oriented to person, place, and time. She appears well-developed and well-nourished. HENT:   Head: Normocephalic and atraumatic. Mouth/Throat: Oropharynx is clear and moist.   Eyes: Conjunctivae are normal. Pupils are equal, round, and reactive to light. Neck: Normal range of motion. Neck supple. Cardiovascular: Normal rate and regular rhythm. Pulmonary/Chest: Effort normal and breath sounds normal. No respiratory distress. Abdominal: Soft. There is no tenderness. Musculoskeletal: Normal range of motion. She exhibits no edema. Lymphadenopathy:     She has no cervical adenopathy. Neurological: She is alert and oriented to person, place, and time. Skin:   She has diffuse urticaria on her arms, neck, back   Nursing note and vitals reviewed. MDM  Number of Diagnoses or Management Options  Diagnosis management comments: 28-year-old female presents with diffuse urticaria    Differential diagnosis includes allergic reaction, urticaria, food allergy, contact dermatitis    Risk of Complications, Morbidity, and/or Mortality  Presenting problems: low  Diagnostic procedures: low  Management options: low    Patient Progress  Patient progress: stable    ED Course   she is otherwise well appearing and has normal vital signs. We will place her on some prednisone and Benadryl for the diffuse urticaria. Voice dictation software was used during the making of this note.   This software is not perfect and grammatical and other typographical errors may be present. This note has been proofread, but may still contain errors.   Negrito Rosado MD; 6/8/2017 @7:26 PM   ===================================================================        Procedures

## 2017-06-12 ENCOUNTER — HOSPITAL ENCOUNTER (EMERGENCY)
Age: 22
Discharge: HOME | End: 2017-06-12
Attending: EMERGENCY MEDICINE
Payer: COMMERCIAL

## 2017-06-12 PROCEDURE — 75810000275 HC EMERGENCY DEPT VISIT NO LEVEL OF CARE: Performed by: EMERGENCY MEDICINE

## 2017-12-07 PROBLEM — Z3A.39 39 WEEKS GESTATION OF PREGNANCY: Status: RESOLVED | Noted: 2017-03-31 | Resolved: 2017-12-07

## 2017-12-08 PROBLEM — O40.9XX0 POLYHYDRAMNIOS: Status: RESOLVED | Noted: 2017-03-07 | Resolved: 2017-12-08

## 2017-12-08 PROBLEM — O41.1230 CHORIOAMNIONITIS IN THIRD TRIMESTER: Status: RESOLVED | Noted: 2017-04-02 | Resolved: 2017-12-08

## 2017-12-08 PROBLEM — O24.419 GESTATIONAL DIABETES: Status: RESOLVED | Noted: 2017-03-31 | Resolved: 2017-12-08

## 2018-12-04 NOTE — PROGRESS NOTES
SVE performed. Pt closed, thick, and high, cervix posterior. Cervidil placed. Pt instructed to remain in bed for next 2 hours. Pt verbalized understanding and has no questions at this time. Call light within reach, will continue to monitor. Telephone Encounter by Zandra Vaughan, RN, BSN at 04/11/18 01:25 PM     Author:  Zandra Vaughan RN, BSN Service:  (none) Author Type:  Registered Nurse     Filed:  04/11/18 01:25 PM Encounter Date:  4/10/2018 Status:  Signed     :  Zandra Vaughan RN, BSN (Registered Nurse)            Patient notified.[CD1.1T]  Med sent to pharmacy of choice[CD1.1M]       Revision History        User Key Date/Time User Provider Type Action    > CD1.1 04/11/18 01:25 PM Zandra Vaughan, RN, BSN Registered Nurse Sign    M - Manual, T - Template

## 2021-07-15 NOTE — PROGRESS NOTES
Gentamycin hung to IVPB. Instructions for subacute rehab:    Principal diagnosis: Left pubic ramus fracture after a fall    Other chronic diagnoses: Hypertension    Activity: Weightbearing as tolerated on left lower extremity.  PT/OT daily    Diet: Regular    Follow-up with PCP after discharge from subacute rehab

## 2022-03-18 PROBLEM — D50.9 IRON DEFICIENCY ANEMIA: Status: ACTIVE | Noted: 2017-01-30

## 2022-03-18 PROBLEM — A63.0 GENITAL WARTS: Status: ACTIVE | Noted: 2017-03-31

## 2022-03-20 PROBLEM — O24.419 WHITE CLASSIFICATION A2 GESTATIONAL DIABETES MELLITUS: Status: ACTIVE | Noted: 2017-03-31

## 2022-08-31 ENCOUNTER — OFFICE VISIT (OUTPATIENT)
Dept: OBGYN CLINIC | Age: 27
End: 2022-08-31
Payer: COMMERCIAL

## 2022-08-31 VITALS
BODY MASS INDEX: 21.13 KG/M2 | DIASTOLIC BLOOD PRESSURE: 74 MMHG | WEIGHT: 123.8 LBS | SYSTOLIC BLOOD PRESSURE: 124 MMHG | HEIGHT: 64 IN

## 2022-08-31 DIAGNOSIS — Z01.419 WELL WOMAN EXAM WITH ROUTINE GYNECOLOGICAL EXAM: Primary | ICD-10-CM

## 2022-08-31 DIAGNOSIS — Z12.4 SCREENING FOR CERVICAL CANCER: ICD-10-CM

## 2022-08-31 DIAGNOSIS — Z11.3 SCREENING FOR STD (SEXUALLY TRANSMITTED DISEASE): ICD-10-CM

## 2022-08-31 LAB
HIV 1+2 AB+HIV1 P24 AG SERPL QL IA: NONREACTIVE
HIV 1/2 RESULT COMMENT: NORMAL

## 2022-08-31 PROCEDURE — 99395 PREV VISIT EST AGE 18-39: CPT | Performed by: OBSTETRICS & GYNECOLOGY

## 2022-08-31 RX ORDER — NORGESTIMATE AND ETHINYL ESTRADIOL 7DAYSX3 28
1 KIT ORAL DAILY
Qty: 84 TABLET | Refills: 3 | Status: SHIPPED | OUTPATIENT
Start: 2022-08-31

## 2022-08-31 NOTE — PROGRESS NOTES
Ganga Rollins  is a 32 y.o.   who is here for an annual exam.      History  Past Medical History:   Diagnosis Date    Back problem     ON FILE  No past surgical history on file. PRESENT IN FILE  No current outpatient medications on file prior to visit. No current facility-administered medications on file prior to visit. SEE UPDATED LIST  Allergies   Allergen Reactions    Wellbutrin [Bupropion] Hives   SEE LIST  Social History     Tobacco Use    Smoking status: Every Day     Types: Cigarettes    Smokeless tobacco: Never   Substance Use Topics    Alcohol use: Yes      Family History   Problem Relation Age of Onset    Hypertension Father     Hypertension Mother     Kidney stones Mother     No Known Problems Brother     Gall Bladder Disease Sister     No Known Problems Sister     No Known Problems Sister      OBGYN History:             Physical Exam  Blood pressure 124/74, height 5' 4\" (1.626 m), weight 123 lb 12.8 oz (56.2 kg), last menstrual period 2022. Body mass index is 21.25 kg/m². No results found for: HGB, HGBP   @LASTPROCAMB([id number];mij23023)@  No results found for: HCGUQC, PREGU, HCGQR, THCGA1    HEENT unremarkable. Sclera non-icteric. Neck is supple without thyromegaly or nodes. Chest clear to auscultation. Heart regular rate and rhythm with no murmur, Breast exam reveals no masses or nipple discharge. No axillary notes are palpable. Abdomen is benign. BUS is normal.  Cervix IF  present. Pap smeaR performed. PRN  Bimanual exam reveals no masses. Assessment  32 y.o.   for annual exam.  Encounter Diagnoses   Name Primary?     Well woman exam with routine gynecological exam Yes    Screening for cervical cancer     Screening for STD (sexually transmitted disease)        Plan  Orders Placed This Encounter   Procedures    PAP IG, CT-NG-TV, rfx Aptima HPV ASCUS ([id number])     Standing Status:   Future     Standing Expiration Date:   2023     Order Specific Question: Pap Source? (Required)     Answer:   cervical     Order Specific Question:   Pap Source? (Required)     Answer:   endocervical     Order Specific Question:   Pap collection method? (Required     Answer:   brush     Order Specific Question:   Pap collection method? (Required     Answer:   spatula     Order Specific Question:   Menstrual Status ? Answer:   Having periods [5]     Order Specific Question:   Date of LMP? (Required)     Answer:   8/26/2022     Order Specific Question:   Previous Treatment?           (Required)     Answer:   NONE    HIV 1/2 Ag/Ab, 4TH Generation,W Rflx Confirm     Standing Status:   Future     Standing Expiration Date:   8/31/2023    Hepatitis B Surface Antigen     Standing Status:   Future     Standing Expiration Date:   8/31/2023    Hepatitis C Antibody     Standing Status:   Future     Standing Expiration Date:   8/31/2023    RPR     Standing Status:   Future     Standing Expiration Date:   8/31/2023   Vag x1  reg cy ocp no cdas smoker angelica!! Std ch   \"DEXA ordered\",AT AGE 61          \"Screening olonoscopy ordered\",IF >44YO  DUE         \"Recommend Calcium/MVI\",IF >35YRS  \"Recommend Gardisil immunization\"IF AGE 9-45     }CONTRACEPTION DISCUSSED      STD CHECK OFFERED IF <32YO  ,MAMMOGRAM SCHEDULED IF >39YO          MOOD DISCUSSED             NOT SUICIDAL  HEALTH MEASURE DISCUSSED INCLUDING TEETH/EYE Alyse Shultz  APPREBECCA                    DIET/EXERCISE /SLEEP    DISCUSSED                SMOKING DISCUSSED PRN      BLADDER CONTROL DISCUSSED

## 2022-09-01 LAB
HBV SURFACE AG SER QL: NONREACTIVE
HCV AB SER QL: NONREACTIVE
RPR SER QL: NONREACTIVE

## 2022-09-06 LAB
C TRACH RRNA CVX QL NAA+PROBE: NEGATIVE
CYTOLOGIST CVX/VAG CYTO: NORMAL
CYTOLOGY CVX/VAG DOC THIN PREP: NORMAL
HPV REFLEX: NORMAL
Lab: NORMAL
N GONORRHOEA RRNA CVX QL NAA+PROBE: NEGATIVE
PATH REPORT.FINAL DX SPEC: NORMAL
STAT OF ADQ CVX/VAG CYTO-IMP: NORMAL
T VAGINALIS RRNA SPEC QL NAA+PROBE: NEGATIVE

## 2023-07-27 NOTE — PROGRESS NOTES
SVe:  Anterior lip/C/+1; will allow to labor down for 1hr on peanut  FTHs cat II--overall reassuring w/ great scalp stim Referred To Mid-Level For Closure Text (Leave Blank If You Do Not Want): After obtaining clear surgical margins the patient was sent to a mid-level provider for surgical repair.  The patient understands they will receive post-surgical care and follow-up from the mid-level provider.